# Patient Record
Sex: FEMALE | Race: WHITE | NOT HISPANIC OR LATINO | Employment: FULL TIME | URBAN - METROPOLITAN AREA
[De-identification: names, ages, dates, MRNs, and addresses within clinical notes are randomized per-mention and may not be internally consistent; named-entity substitution may affect disease eponyms.]

---

## 2017-03-03 RX ORDER — LORATADINE 10 MG/1
10 TABLET ORAL DAILY
Qty: 90 TABLET | Refills: 2 | Status: SHIPPED | OUTPATIENT
Start: 2017-03-03 | End: 2018-11-25

## 2017-03-03 RX ORDER — GABAPENTIN 300 MG/1
300 CAPSULE ORAL 2 TIMES DAILY
Qty: 180 CAPSULE | Refills: 2 | Status: SHIPPED | OUTPATIENT
Start: 2017-03-03 | End: 2017-08-22 | Stop reason: SDUPTHER

## 2017-03-09 ENCOUNTER — OFFICE VISIT (OUTPATIENT)
Dept: FAMILY MEDICINE CLINIC | Facility: CLINIC | Age: 62
End: 2017-03-09

## 2017-03-09 VITALS
BODY MASS INDEX: 22.36 KG/M2 | WEIGHT: 151 LBS | SYSTOLIC BLOOD PRESSURE: 108 MMHG | HEIGHT: 69 IN | HEART RATE: 72 BPM | RESPIRATION RATE: 16 BRPM | OXYGEN SATURATION: 97 % | DIASTOLIC BLOOD PRESSURE: 64 MMHG

## 2017-03-09 DIAGNOSIS — F51.01 PRIMARY INSOMNIA: Primary | ICD-10-CM

## 2017-03-09 PROBLEM — G43.109 MIGRAINE WITH AURA: Status: ACTIVE | Noted: 2017-03-09

## 2017-03-09 PROBLEM — M51.36 DDD (DEGENERATIVE DISC DISEASE), LUMBAR: Status: ACTIVE | Noted: 2017-03-09

## 2017-03-09 PROBLEM — G43.909 MIGRAINE: Status: ACTIVE | Noted: 2017-03-09

## 2017-03-09 PROBLEM — M79.609 EXTREMITY PAIN: Status: ACTIVE | Noted: 2017-03-09

## 2017-03-09 PROBLEM — M54.50 LBP (LOW BACK PAIN): Status: ACTIVE | Noted: 2017-03-09

## 2017-03-09 PROBLEM — M51.369 DDD (DEGENERATIVE DISC DISEASE), LUMBAR: Status: ACTIVE | Noted: 2017-03-09

## 2017-03-09 PROCEDURE — 99213 OFFICE O/P EST LOW 20 MIN: CPT | Performed by: FAMILY MEDICINE

## 2017-03-09 RX ORDER — ZOLPIDEM TARTRATE 10 MG/1
10 TABLET ORAL NIGHTLY PRN
Qty: 30 TABLET | Refills: 0 | OUTPATIENT
Start: 2017-03-09 | End: 2017-03-09 | Stop reason: SDUPTHER

## 2017-03-09 RX ORDER — ZOLPIDEM TARTRATE 10 MG/1
10 TABLET ORAL NIGHTLY PRN
Qty: 30 TABLET | Refills: 0 | Status: SHIPPED | OUTPATIENT
Start: 2017-03-09 | End: 2019-03-11 | Stop reason: SDUPTHER

## 2017-03-09 NOTE — PROGRESS NOTES
Subjective   Linda Cooper is a 61 y.o. female.     History of Present Illness   Linda is here for medication follow up.  She states she still takes Ambien occasionally and for that reason she is here to sign her contract.It helps her and she uses primarily for travel.    The following portions of the patient's history were reviewed and updated as appropriate: allergies, current medications, past medical history, past social history and problem list.    Review of Systems   All other systems reviewed and are negative.      Objective   Physical Exam   Constitutional: She is oriented to person, place, and time. She appears well-developed and well-nourished.   Cardiovascular: Normal rate and regular rhythm.    Pulmonary/Chest: Effort normal and breath sounds normal.   Neurological: She is alert and oriented to person, place, and time.   Psychiatric: She has a normal mood and affect. Her behavior is normal. Judgment and thought content normal.   Vitals reviewed.      Assessment/Plan   Linda was seen today for insomnia.    Diagnoses and all orders for this visit:    Primary insomnia  -     -     zolpidem (AMBIEN) 10 MG tablet; Take 1 tablet by mouth At Night As Needed (may use prn when traveling).      The patient has read and signed the [facility] Controlled Substance Contract.  I will continue to see patient for regular follow up appointments.  She are well controlled on current medication.  KHOI has been reviewed by me and is updated every 3 months. The patient is aware of the potential for addiction and dependence.

## 2017-05-04 ENCOUNTER — APPOINTMENT (OUTPATIENT)
Dept: WOMENS IMAGING | Facility: HOSPITAL | Age: 62
End: 2017-05-04

## 2017-05-04 PROCEDURE — 77067 SCR MAMMO BI INCL CAD: CPT | Performed by: RADIOLOGY

## 2017-08-22 RX ORDER — GABAPENTIN 300 MG/1
300 CAPSULE ORAL 2 TIMES DAILY
Qty: 60 CAPSULE | Refills: 0 | OUTPATIENT
Start: 2017-08-22 | End: 2017-11-17 | Stop reason: SDUPTHER

## 2017-08-22 NOTE — TELEPHONE ENCOUNTER
Patient left a VM requesting RF for Gabapentin.  KHOI has been placed in your folder for review.  Last OV: 03.2017 - Patient is due for an OV.  Ok to call-in 1MO. supply to the pharmacy until seen ?

## 2017-10-13 RX ORDER — GABAPENTIN 300 MG/1
300 CAPSULE ORAL 2 TIMES DAILY
Qty: 60 CAPSULE | Refills: 0
Start: 2017-10-13 | End: 2017-11-17 | Stop reason: SDUPTHER

## 2017-11-17 ENCOUNTER — OFFICE VISIT (OUTPATIENT)
Dept: FAMILY MEDICINE CLINIC | Facility: CLINIC | Age: 62
End: 2017-11-17

## 2017-11-17 VITALS
WEIGHT: 153 LBS | SYSTOLIC BLOOD PRESSURE: 106 MMHG | HEIGHT: 69 IN | BODY MASS INDEX: 22.66 KG/M2 | DIASTOLIC BLOOD PRESSURE: 72 MMHG | HEART RATE: 66 BPM | OXYGEN SATURATION: 99 %

## 2017-11-17 DIAGNOSIS — G89.29 CHRONIC LOW BACK PAIN, UNSPECIFIED BACK PAIN LATERALITY, WITH SCIATICA PRESENCE UNSPECIFIED: ICD-10-CM

## 2017-11-17 DIAGNOSIS — M79.661 PAIN IN BOTH LOWER LEGS: Primary | ICD-10-CM

## 2017-11-17 DIAGNOSIS — G43.109 MIGRAINE WITH AURA AND WITHOUT STATUS MIGRAINOSUS, NOT INTRACTABLE: ICD-10-CM

## 2017-11-17 DIAGNOSIS — M54.5 CHRONIC LOW BACK PAIN, UNSPECIFIED BACK PAIN LATERALITY, WITH SCIATICA PRESENCE UNSPECIFIED: ICD-10-CM

## 2017-11-17 DIAGNOSIS — Z23 NEED FOR VACCINATION: ICD-10-CM

## 2017-11-17 DIAGNOSIS — M79.662 PAIN IN BOTH LOWER LEGS: Primary | ICD-10-CM

## 2017-11-17 PROCEDURE — 90471 IMMUNIZATION ADMIN: CPT | Performed by: FAMILY MEDICINE

## 2017-11-17 PROCEDURE — 99214 OFFICE O/P EST MOD 30 MIN: CPT | Performed by: FAMILY MEDICINE

## 2017-11-17 PROCEDURE — 90686 IIV4 VACC NO PRSV 0.5 ML IM: CPT | Performed by: FAMILY MEDICINE

## 2017-11-17 RX ORDER — GABAPENTIN 300 MG/1
300 CAPSULE ORAL 2 TIMES DAILY
Qty: 60 CAPSULE | Refills: 5 | Status: SHIPPED | OUTPATIENT
Start: 2017-11-17 | End: 2018-11-05 | Stop reason: SDUPTHER

## 2017-11-17 RX ORDER — RIZATRIPTAN BENZOATE 10 MG/1
10 TABLET ORAL ONCE AS NEEDED
Qty: 9 TABLET | Refills: 5 | Status: SHIPPED | OUTPATIENT
Start: 2017-11-17 | End: 2018-12-15 | Stop reason: SDUPTHER

## 2017-11-17 NOTE — PROGRESS NOTES
Subjective   Linda Cooper is a 62 y.o. female.     History of Present Illness   Linda is here today for a medication visit for a refill of her Gabapentin. She is taking this to help with nerve discomfort in her legs.  She has seasonal allergies and she has noticed that she gets jittery on Claritin.  She has a hx of migraine  She has been on an estrderm patch for about 10 years  And has had trouble because she has been cutting about a 30 % of the patch off at a time.    The following portions of the patient's history were reviewed and updated as appropriate: allergies, current medications, past medical history, past social history and problem list.    Review of Systems   All other systems reviewed and are negative.      Objective   Physical Exam   Constitutional: She is oriented to person, place, and time. She appears well-developed and well-nourished. No distress.   HENT:   Head: Normocephalic and atraumatic.   Eyes: EOM are normal. Pupils are equal, round, and reactive to light.   Cardiovascular: Normal rate and regular rhythm.    Pulmonary/Chest: Effort normal and breath sounds normal.   Musculoskeletal:        Lumbar back: She exhibits decreased range of motion, tenderness and spasm. She exhibits no swelling, no edema and no deformity.   Pain increases when rising from a chair, getting into car. Pain with straight leg raising on affected side.   Neurological: She is alert and oriented to person, place, and time.   Skin: Skin is warm and dry. No rash noted.   Nursing note and vitals reviewed.      Assessment/Plan   Linda was seen today for back pain and med refill.    Diagnoses and all orders for this visit:    Pain in both lower legs  -     gabapentin (NEURONTIN) 300 MG capsule; Take 1 capsule by mouth 2 (Two) Times a Day.    Chronic low back pain, unspecified back pain laterality, with sciatica presence unspecified    Migraine with aura and without status migrainosus, not intractable  -     rizatriptan  (MAXALT) 10 MG tablet; Take 1 tablet by mouth 1 (One) Time As Needed for Migraine for up to 9 doses. TAKE 1 TABLET BY MOUTH AT ONSET OF SYMPTOMS.

## 2018-05-07 ENCOUNTER — APPOINTMENT (OUTPATIENT)
Dept: WOMENS IMAGING | Facility: HOSPITAL | Age: 63
End: 2018-05-07

## 2018-05-07 PROCEDURE — 77063 BREAST TOMOSYNTHESIS BI: CPT | Performed by: RADIOLOGY

## 2018-05-07 PROCEDURE — 77067 SCR MAMMO BI INCL CAD: CPT | Performed by: RADIOLOGY

## 2018-05-24 DIAGNOSIS — F51.01 PRIMARY INSOMNIA: ICD-10-CM

## 2018-05-25 RX ORDER — ZOLPIDEM TARTRATE 10 MG/1
TABLET ORAL
Qty: 30 TABLET | OUTPATIENT
Start: 2018-05-25

## 2018-07-26 ENCOUNTER — TELEPHONE (OUTPATIENT)
Dept: FAMILY MEDICINE CLINIC | Facility: CLINIC | Age: 63
End: 2018-07-26

## 2018-07-26 NOTE — TELEPHONE ENCOUNTER
Patient states that for the last 3 weeks she has had sciatica. She does have hx of back surgeries and problems. She is currently in [place] and has an appointment schedule with Sports Medicine on Tuesday and and appointment with a neurosurgeon in [place] at the end of September. Patient was advised to go to an Urgent care near her to be evaluated and get xrays if needed. She was advised to obtain an xray disk if images are taken and bring them to follow up appointments. She will contact her insurance to see if they require a referral to sports medicine. Patient voiced understanding.

## 2018-07-30 ENCOUNTER — TELEPHONE (OUTPATIENT)
Dept: FAMILY MEDICINE CLINIC | Facility: CLINIC | Age: 63
End: 2018-07-30

## 2018-07-30 NOTE — TELEPHONE ENCOUNTER
Pt in [place] and wanted you to know she is having terrible back and leg pain.  She is seeking treatment there but wanted you to be aware.  She will send records.

## 2018-08-14 ENCOUNTER — OFFICE VISIT (OUTPATIENT)
Dept: FAMILY MEDICINE CLINIC | Facility: CLINIC | Age: 63
End: 2018-08-14

## 2018-08-14 VITALS
RESPIRATION RATE: 16 BRPM | WEIGHT: 156 LBS | OXYGEN SATURATION: 99 % | BODY MASS INDEX: 23.11 KG/M2 | DIASTOLIC BLOOD PRESSURE: 66 MMHG | HEIGHT: 69 IN | SYSTOLIC BLOOD PRESSURE: 102 MMHG | HEART RATE: 57 BPM

## 2018-08-14 DIAGNOSIS — G89.29 CHRONIC BILATERAL LOW BACK PAIN WITH RIGHT-SIDED SCIATICA: Primary | ICD-10-CM

## 2018-08-14 DIAGNOSIS — M54.41 CHRONIC BILATERAL LOW BACK PAIN WITH RIGHT-SIDED SCIATICA: Primary | ICD-10-CM

## 2018-08-14 PROCEDURE — 99213 OFFICE O/P EST LOW 20 MIN: CPT | Performed by: FAMILY MEDICINE

## 2018-08-14 NOTE — PROGRESS NOTES
Subjective   Linda Cooper is a 63 y.o. female.     History of Present Illness   Linda is here w/ back pain.  This is a chronic problem for her.  She has had back surgery x 2 w/ Dr Camarillo.  She is seeing the PA on 8/29/18 but feels as if she needs to be seen sooner.  She has been on steroids and is now on Aleve and she is taking gabapentin at night.     The following portions of the patient's history were reviewed and updated as appropriate: allergies, current medications, past medical history, past social history, past surgical history and problem list.    Review of Systems   Musculoskeletal: Positive for back pain.        Rt leg pain   All other systems reviewed and are negative.      Objective   Physical Exam   Constitutional: She is oriented to person, place, and time. She appears well-developed.   Musculoskeletal:   Pain with ambulation and she is listing to the left.   Neurological: She is alert and oriented to person, place, and time.   Nursing note and vitals reviewed.      Assessment/Plan   Linda was seen today for back pain.    Diagnoses and all orders for this visit:    Chronic bilateral low back pain with right-sided sciatica    I have encouraged her to get on the cancellation list and advised her to swim, rest until she can see her neurosurgeon.

## 2018-08-29 ENCOUNTER — OFFICE VISIT (OUTPATIENT)
Dept: NEUROSURGERY | Facility: CLINIC | Age: 63
End: 2018-08-29

## 2018-08-29 VITALS
HEART RATE: 68 BPM | HEIGHT: 69 IN | WEIGHT: 154.8 LBS | DIASTOLIC BLOOD PRESSURE: 68 MMHG | SYSTOLIC BLOOD PRESSURE: 111 MMHG | BODY MASS INDEX: 22.93 KG/M2

## 2018-08-29 DIAGNOSIS — M51.36 DDD (DEGENERATIVE DISC DISEASE), LUMBAR: Primary | ICD-10-CM

## 2018-08-29 DIAGNOSIS — M48.062 SPINAL STENOSIS, LUMBAR REGION, WITH NEUROGENIC CLAUDICATION: ICD-10-CM

## 2018-08-29 PROCEDURE — 99244 OFF/OP CNSLTJ NEW/EST MOD 40: CPT | Performed by: PHYSICIAN ASSISTANT

## 2018-08-29 RX ORDER — ESTRADIOL 0.1 MG/D
1 PATCH TRANSDERMAL WEEKLY
COMMUNITY
Start: 2018-08-22 | End: 2022-05-03 | Stop reason: SDUPTHER

## 2018-08-29 NOTE — PROGRESS NOTES
Subjective   Patient ID: Linda Cooper is a 63 y.o. female is being seen for consultation today at the request of Billy Poole MD for lower back pain that radiates into the right leg. Patient complains of numbness and tingling in her right leg. Patient has had 2 previous back SX. Patient had a open right L4-L5 microdiscectomy with Dr. Camarillo in 2012.    Back Pain   This is a chronic problem. The current episode started more than 1 year ago. The problem occurs constantly. The problem has been gradually worsening since onset. The pain is present in the lumbar spine. The quality of the pain is described as aching (aching in right calf, spasms). The pain radiates to the right foot, right knee and right thigh. The pain is at a severity of 6/10. The pain is moderate. The symptoms are aggravated by lying down, position, bending and twisting. Stiffness is present all day. Associated symptoms include leg pain, numbness, tingling and weakness. Pertinent negatives include no bladder incontinence, bowel incontinence or chest pain. She has tried chiropractic manipulation for the symptoms.       The following portions of the patient's history were reviewed and updated as appropriate: allergies, current medications, past family history, past medical history, past social history, past surgical history and problem list.    Review of Systems   Respiratory: Negative for chest tightness and shortness of breath.    Cardiovascular: Negative for chest pain.   Gastrointestinal: Negative for bowel incontinence.   Genitourinary: Negative for bladder incontinence.   Musculoskeletal: Positive for arthralgias and back pain.   Neurological: Positive for tingling, weakness and numbness.   All other systems reviewed and are negative.      Objective   Physical Exam   Constitutional: She is oriented to person, place, and time. She appears well-developed and well-nourished.   HENT:   Head: Normocephalic and atraumatic.   Right Ear:  External ear normal.   Left Ear: External ear normal.   Eyes: Pupils are equal, round, and reactive to light. Conjunctivae and EOM are normal. Right eye exhibits no discharge. Left eye exhibits no discharge.   Neck: Normal range of motion. Neck supple. No tracheal deviation present.   Pulmonary/Chest: Effort normal. No stridor. No respiratory distress.   Musculoskeletal: Normal range of motion. She exhibits no edema, tenderness or deformity.   Neurological: She is alert and oriented to person, place, and time. She has normal strength and normal reflexes. She displays no atrophy, no tremor and normal reflexes. No cranial nerve deficit or sensory deficit. She exhibits normal muscle tone. She displays a negative Romberg sign. She displays no seizure activity. Coordination and gait normal.   No long tract signs   Skin: Skin is warm and dry.   Psychiatric: She has a normal mood and affect. Her behavior is normal. Judgment and thought content normal.   Nursing note and vitals reviewed.    Neurologic Exam     Mental Status   Oriented to person, place, and time.     Cranial Nerves     CN III, IV, VI   Pupils are equal, round, and reactive to light.  Extraocular motions are normal.     Motor Exam     Strength   Strength 5/5 throughout.       Assessment/Plan   Independent Review of Radiographic Studies:    Did review the MRI of the lumbar spine from [facility] in [place] from July 27, 2018.  It shows multilevel degenerative disc disease that is most severe at L4-L5 L5-S1 with degenerative scoliosis with the apex at L2-L3.  There is a mild grade 1 anterolisthesis at L2-L3.  There is multilevel disc bulging and facet arthropathy with ligamentum flavum thickening with moderate spinal stenosis at L2-L3 and L3-L4.  There is multilevel foraminal narrowing particularly on the right at L2-L3.    Medical Decision Making:    Ms Cooper is referred back to us today by Dr. Poole for increased back and leg pain particularly  over the last 6 weeks.  She had a previous microdiscectomy at L5-S1 over 25 years ago.  She also had an open right L4-L5 microdiscectomy by Dr. Camarillo in 2012.  She did well over the years until just the last 2 months when she began having fairly significant back pain that radiates into the right buttock/groin and anterolateral thigh.  The pain is described as a deep ache/throbbing discomfort that is moderate in severity.  It worsens with any prolonged activity and improved some with rest.  She normally does yoga on a regular basis and also swims and walks daily.  She has had to really limit her activity over the last few weeks because of the pain.  She has been placed on gabapentin 300 mg twice a day and is also using naproxen at night with mild relief.  She denies any numbness or tingling or incontinence or focal weakness.    Her exam does not reveal any focal deficits.  She has a negative Homar sign.    I did review the MRI with her and explained that her symptoms do fit with the moderate stenosis at L2-L3 and L3-L4.  She does have some low back pain as well which is certainly more attributable to the degenerative change.  She would very much like to avoid additional surgical intervention if possible.  We discussed both physical therapy as well as epidural injections.  She is aware of the risks of bleeding, infection and headache with the epidurals and does wish to proceed.  She will call with any worsening and otherwise follow-up in 6 weeks.  If the pain fails to improve we could consider physical therapy or even a myelogram which we did discuss today.  Linda was seen today for back pain.    Diagnoses and all orders for this visit:    DDD (degenerative disc disease), lumbar  -     Epidural Block    Spinal stenosis, lumbar region, with neurogenic claudication  -     Epidural Block      Return in about 6 weeks (around 10/10/2018).

## 2018-09-13 ENCOUNTER — ANESTHESIA (OUTPATIENT)
Dept: PAIN MEDICINE | Facility: HOSPITAL | Age: 63
End: 2018-09-13

## 2018-09-13 ENCOUNTER — HOSPITAL ENCOUNTER (OUTPATIENT)
Dept: GENERAL RADIOLOGY | Facility: HOSPITAL | Age: 63
Discharge: HOME | End: 2018-09-13

## 2018-09-13 ENCOUNTER — HOSPITAL ENCOUNTER (OUTPATIENT)
Dept: PAIN MEDICINE | Facility: HOSPITAL | Age: 63
Discharge: HOME | End: 2018-09-13
Admitting: ANESTHESIOLOGY

## 2018-09-13 ENCOUNTER — ANESTHESIA EVENT (OUTPATIENT)
Dept: PAIN MEDICINE | Facility: HOSPITAL | Age: 63
End: 2018-09-13

## 2018-09-13 VITALS
BODY MASS INDEX: 21.92 KG/M2 | HEART RATE: 73 BPM | RESPIRATION RATE: 16 BRPM | WEIGHT: 148 LBS | HEIGHT: 69 IN | SYSTOLIC BLOOD PRESSURE: 98 MMHG | TEMPERATURE: 97.5 F | OXYGEN SATURATION: 99 % | DIASTOLIC BLOOD PRESSURE: 54 MMHG

## 2018-09-13 DIAGNOSIS — R52 PAIN: ICD-10-CM

## 2018-09-13 DIAGNOSIS — M51.36 DDD (DEGENERATIVE DISC DISEASE), LUMBAR: Primary | ICD-10-CM

## 2018-09-13 DIAGNOSIS — M48.062 SPINAL STENOSIS, LUMBAR REGION, WITH NEUROGENIC CLAUDICATION: ICD-10-CM

## 2018-09-13 PROCEDURE — 25010000002 METHYLPREDNISOLONE PER 80 MG: Performed by: ANESTHESIOLOGY

## 2018-09-13 PROCEDURE — 25010000002 MIDAZOLAM PER 1 MG: Performed by: ANESTHESIOLOGY

## 2018-09-13 PROCEDURE — C1755 CATHETER, INTRASPINAL: HCPCS

## 2018-09-13 PROCEDURE — 77003 FLUOROGUIDE FOR SPINE INJECT: CPT

## 2018-09-13 RX ORDER — FENTANYL CITRATE 50 UG/ML
50 INJECTION, SOLUTION INTRAMUSCULAR; INTRAVENOUS AS NEEDED
Status: DISCONTINUED | OUTPATIENT
Start: 2018-09-13 | End: 2018-09-14 | Stop reason: HOSPADM

## 2018-09-13 RX ORDER — MIDAZOLAM HYDROCHLORIDE 1 MG/ML
1 INJECTION INTRAMUSCULAR; INTRAVENOUS
Status: DISCONTINUED | OUTPATIENT
Start: 2018-09-13 | End: 2018-09-14 | Stop reason: HOSPADM

## 2018-09-13 RX ORDER — LIDOCAINE HYDROCHLORIDE 10 MG/ML
1 INJECTION, SOLUTION INFILTRATION; PERINEURAL ONCE
Status: DISCONTINUED | OUTPATIENT
Start: 2018-09-13 | End: 2018-09-14 | Stop reason: HOSPADM

## 2018-09-13 RX ORDER — METHYLPREDNISOLONE ACETATE 80 MG/ML
80 INJECTION, SUSPENSION INTRA-ARTICULAR; INTRALESIONAL; INTRAMUSCULAR; SOFT TISSUE ONCE
Status: COMPLETED | OUTPATIENT
Start: 2018-09-13 | End: 2018-09-13

## 2018-09-13 RX ADMIN — MIDAZOLAM HYDROCHLORIDE 2 MG: 2 INJECTION, SOLUTION INTRAMUSCULAR; INTRAVENOUS at 09:50

## 2018-09-13 RX ADMIN — METHYLPREDNISOLONE ACETATE 80 MG: 80 INJECTION, SUSPENSION INTRA-ARTICULAR; INTRALESIONAL; INTRAMUSCULAR; SOFT TISSUE at 09:57

## 2018-09-13 NOTE — ANESTHESIA PROCEDURE NOTES
PAIN Epidural block    Patient location during procedure: pain clinic  Start Time: 9/13/2018 9:47 AM  Stop Time: 9/13/2018 10:00 AM  Indication:at surgeon's request and procedure for pain  Performed By  Anesthesiologist: SHAWNA FLOREZ  Preanesthetic Checklist  Completed: patient identified, site marked, surgical consent, pre-op evaluation, timeout performed, IV checked, risks and benefits discussed and monitors and equipment checked  Additional Notes  Dx:  Post-Op Diagnosis Codes:     * Lumbar degenerative disc disease (M51.36)     * Lumbar spinal stenosis (M48.061)  80 mg depomedrol in epid    Plan : return to clinic as needed  Prep:  Pt Position:prone (prone)  Sterile Tech:cap, gloves, mask and sterile barrier  Prep:chlorhexidine gluconate and isopropyl alcohol  Monitoring:blood pressure monitoring, EKG and continuous pulse oximetry  Procedure:  Sedation: yes   Approach:right paramedian  Guidance: fluoroscopy and c arm pa and lat and loss of resistance  Location:lumbar  Level:3-4 (interlaminar)  Needle Type:[employer]  Needle Gauge:20  Aspiration:negative  Medications:  Depomedrol:80 mg  Preservative Free Saline:3mL    Post Assessment:  Post-procedure: bandaid.  Pt Tolerance:patient tolerated the procedure well with no apparent complications  Complications:no

## 2018-09-13 NOTE — H&P
[facility]    History and Physical    Patient Name: Linda Cooper  :  1955  MRN:  [id number]  Date of Admission: 2018    Subjective     Patient is a 63 y.o. female presents with chief complaint of chronic, constant, moderate, 1-4/10, ? etiology, aching, spasms, numbness leg: groin, right pain.  Onset of symptoms was gradual starting several months ago.  Symptoms are associated/aggravated by activity. Symptoms improve with pain medication and rest    The following portions of the patients history were reviewed and updated as appropriate: current medications, allergies, past medical history, past surgical history, past family history, past social history and problem list                Objective     Past Medical History:   Past Medical History:   Diagnosis Date   • Allergic    • Endometriosis     Followed by Dr. Cervantes. S/p hysterectomy .   • March fracture     Marche fracture. healing well. Followed by Dr. Flores.   • Menopausal symptoms     Will refil estradiol.   • Migraine     Will start Frova and she will also try Excedrin migraine.   • Mitral regurgitation     Trivial mitral regurg. No antibiotics needed.   • Sciatica     Sciatica and s/p disc surgery at L5-S1. Stable now with occasional bouts of sciatica.     Past Surgical History:   Past Surgical History:   Procedure Laterality Date   • HYSTERECTOMY  2007    Endometriosis. Followed by Dr. Cervantes. S/p hysterectomy .   • LUMBAR DISC SURGERY      Sciatica and s/p disc surgery at L5-S1. Stable now with occasional bouts of sciatica.     Family History:   Family History   Problem Relation Age of Onset   • Hypertension Mother    • Coronary artery disease Mother    • Coronary artery disease Father    • Other Brother         Thyroid     Social History:   Social History   Substance Use Topics   • Smoking status: Current Some Day Smoker     Packs/day: 0.25     Types: Cigarettes   • Smokeless tobacco: Never Used   • Alcohol use Yes       Comment: 4-5 drinks/week       Vital Signs Range for the last 24 hours  Temperature:     Temp Source:     BP:     Pulse:     Respirations:     SPO2:     O2 Amount (l/min):     O2 Devices     Weight:           --------------------------------------------------------------------------------    Current Outpatient Prescriptions   Medication Sig Dispense Refill   • CLIMARA 0.1 MG/24HR patch      • Estradiol (ESTRADERM TD) Place 1 film on the skin 1 (one) time per week. Estraderm 0.1 mg/24 hours twice weekly transdermal film, extended release     • gabapentin (NEURONTIN) 300 MG capsule Take 1 capsule by mouth 2 (Two) Times a Day. 60 capsule 5   • loratadine (CLARITIN) 10 MG tablet Take 1 tablet by mouth Daily. TAKE 1 TABLET BY MOUTH EVERY DAY 90 tablet 2   • magnesium oxide (MAGOX) 400 (241.3 MG) MG tablet tablet Take 1 tablet by mouth Daily. TAKE 1 TABLET BY MOUTH EVERY DAY 90 each 2   • rizatriptan (MAXALT) 10 MG tablet Take 1 tablet by mouth 1 (One) Time As Needed for Migraine for up to 9 doses. TAKE 1 TABLET BY MOUTH AT ONSET OF SYMPTOMS. 9 tablet 5   • zolpidem (AMBIEN) 10 MG tablet Take 1 tablet by mouth At Night As Needed (may use prn when traveling). 30 tablet 0     Current Facility-Administered Medications   Medication Dose Route Frequency Provider Last Rate Last Dose   • fentaNYL citrate (PF) (SUBLIMAZE) injection 50 mcg  50 mcg Intravenous PRN Jose Narayanan MD       • iopamidol (ISOVUE-M 200) injection 41%  3 mL Epidural Once in imaging Jose Narayanan MD       • lidocaine (XYLOCAINE) 1 % injection 1 mL  1 mL Intradermal Once Jose Narayanan MD       • methylPREDNISolone acetate (DEPO-medrol) injection 80 mg  80 mg Intramuscular Once Jose Narayanan MD       • midazolam (VERSED) injection 1 mg  1 mg Intravenous Q5 Min PRN Jose Narayanan MD           --------------------------------------------------------------------------------  Assessment/Plan      Anesthesia Evaluation     Patient summary reviewed  and Nursing notes reviewed         Pain impairs ability to perform ADLs: Housekeeping, Ambulation, Exercise/Activity and Working  Modalities previously tried to control pain with limited effectiveness within the last 4-6 weeks: Rest, OTC medications and Other     Airway   Mallampati: II  Dental - normal exam     Pulmonary - negative pulmonary ROS and normal exam   Cardiovascular - normal exam    (+) valvular problems/murmurs,       Neuro/Psych- negative ROS and neuro exam normal  GI/Hepatic/Renal/Endo - negative ROS     Musculoskeletal (-) negative ROS and normal exam    Abdominal  - normal exam   Substance History - negative use     OB/GYN negative ob/gyn ROS         Other            Phys Exam Other: acupuncture         Diagnosis and Plan    Treatment Plan  ASA 2      Procedures: Lumbar Epidural Steroid Injection(LESI), With fluoroscopy,       Anesthetic plan and risks discussed with patient.          Diagnosis     * Lumbar degenerative disc disease [M51.36]     * Lumbar spinal stenosis [M48.061]              CHIEF COMPLAINT:       HISTORY OF PRESENT ILLNESS:      PAST MEDICAL HISTORY:  Current Outpatient Prescriptions on File Prior to Encounter   Medication Sig Dispense Refill   • CLIMARA 0.1 MG/24HR patch      • Estradiol (ESTRADERM TD) Place 1 film on the skin 1 (one) time per week. Estraderm 0.1 mg/24 hours twice weekly transdermal film, extended release     • gabapentin (NEURONTIN) 300 MG capsule Take 1 capsule by mouth 2 (Two) Times a Day. 60 capsule 5   • loratadine (CLARITIN) 10 MG tablet Take 1 tablet by mouth Daily. TAKE 1 TABLET BY MOUTH EVERY DAY 90 tablet 2   • magnesium oxide (MAGOX) 400 (241.3 MG) MG tablet tablet Take 1 tablet by mouth Daily. TAKE 1 TABLET BY MOUTH EVERY DAY 90 each 2   • rizatriptan (MAXALT) 10 MG tablet Take 1 tablet by mouth 1 (One) Time As Needed for Migraine for up to 9 doses. TAKE 1 TABLET BY MOUTH AT ONSET OF SYMPTOMS. 9 tablet 5   • zolpidem (AMBIEN) 10 MG tablet Take 1  tablet by mouth At Night As Needed (may use prn when traveling). 30 tablet 0     No current facility-administered medications on file prior to encounter.        Past Medical History:   Diagnosis Date   • Allergic    • Endometriosis     Followed by Dr. Cervantes. S/p hysterectomy 12/07.   • March fracture     Marche fracture. healing well. Followed by Dr. Flores.   • Menopausal symptoms     Will refil estradiol.   • Migraine     Will start Frova and she will also try Excedrin migraine.   • Mitral regurgitation     Trivial mitral regurg. No antibiotics needed.   • Sciatica     Sciatica and s/p disc surgery at L5-S1. Stable now with occasional bouts of sciatica.         SOCIAL HISTORY:  No tobacco    REVIEW OF SYSTEMS:  No hematologic infectious or constitutional symptoms  Other review of systems non-contributory    PHYSICAL EXAM:  There were no vitals taken for this visit.  Well-developed well-nourished no acute distress  Extra ocular movements intact  Mallampati class 2 airway  Cardiac:  Regular rate and rhythm  Lungs:  Clear to auscultation bilaterally with good effort  Alert and oriented ×3  Deep tendon reflexes normal in the bilateral patella  Negative straight leg raise bilaterally  5 out of 5 strength bilateral upper and lower extremities  Lumbar spine without obvious deformities ecchymoses  Lumbar spine nontender to palpation      DIAGNOSIS:  Post-Op Diagnosis Codes:     * Lumbar degenerative disc disease [M51.36]     * Lumbar spinal stenosis [M48.061]    PLAN:  1.  Lumbar epidural steroid injections, up to 3, spaced 1-2 weeks apart.  If pain control is acceptable after 1 or 2 injections, it was discussed with the patient that they may return for the subsequent injections if and when their pain returns.  The risks were discussed with the patient including failure of relief, worsening pain, Headache (post dural puncture headache), bleeding (epidural hematoma) and infection (epidural abscess or skin infection).  2.   Physical therapy exercises at home as prescribed by physical therapy or from the pain clinic handout (given to the patient).  Continuation of these exercises every day, or multiple times per week, even when the patient has good pain relief, was stressed to the patient as a preventative measure to decrease the frequency and severity of future pain episodes.  3.  Continue pain medicines as already prescribed.  If patient not currently taking any, it is recommended to begin Acetaminophen 1000 mg po q 8 hours.  If other medicines containing Acetaminophen are currently prescribed, maintain daily dose at 3000 mg.    4.  If they can tolerate NSAIDS, it is recommended to take Ibuprofen 600 mg po q 6 hours for 7 days during pain exacerbations.  Alternatively, they may substitute an NSAID of their choice (e.g. Aleve).  This may be taken at the same time as Acetaminophen.  5.  Heat and ice to the affected area as tolerated for pain control.  It was discussed that heating pads can cause burns.  6.  Daily low impact exercise such as walking or water exercise was recommended to maintain overall health and aid in weight control.   7.  Follow up as needed for subsequent injections.  8.  Patient was counseled to abstain from tobacco products.

## 2018-09-17 ENCOUNTER — TELEPHONE (OUTPATIENT)
Dept: NEUROSURGERY | Facility: CLINIC | Age: 63
End: 2018-09-17

## 2018-09-17 NOTE — TELEPHONE ENCOUNTER
She had one ADY last Thursday and only has noticed 30% relief. She was told by pain management that it maybe a week before she notices any improvement. She was wanted to know your opinion on CBD oil or magnesium supplement ? She also had questions on how soon she should have her next ADY. She is scheduled in two weeks ro 2nd ADY. I let her know that they do build on each other.

## 2018-09-18 NOTE — TELEPHONE ENCOUNTER
Yes, I would get the next one in 2wks as scheduled if her pain has not improved. I don't think mg would help but it would not hurt. To be honest there just aren't enough studies on CBD oil that I can make any recommendation for that either.  I would try the next ADY and see how she feels.

## 2018-09-19 NOTE — TELEPHONE ENCOUNTER
Patient returned Carey's call. I gave her the information from [place]. She verbalized understanding

## 2018-09-27 ENCOUNTER — APPOINTMENT (OUTPATIENT)
Dept: PAIN MEDICINE | Facility: HOSPITAL | Age: 63
End: 2018-09-27

## 2018-10-02 NOTE — PROGRESS NOTES
Subjective   Patient ID: Linda Cooper is a 63 y.o. female is here today for follow-up for back and right leg pain after ADY on 10/04/18. Patient states she noticed relief up until today. She is having increased right sided back pain that radiates into her right leg. Patient complains of numbness in her right foot.     Back Pain   This is a recurrent problem. The problem occurs intermittently. The problem has been gradually worsening since onset. The pain is present in the lumbar spine. The quality of the pain is described as aching. The pain radiates to the right foot, right knee and right thigh. The pain is at a severity of 5/10. The pain is moderate. The symptoms are aggravated by position, sitting and standing. Associated symptoms include leg pain, numbness, pelvic pain, tingling and weakness. Pertinent negatives include no bladder incontinence, bowel incontinence or chest pain. Treatments tried: L-ADY. The treatment provided mild relief.       The following portions of the patient's history were reviewed and updated as appropriate: allergies, current medications, past family history, past medical history, past social history, past surgical history and problem list.    Review of Systems   Respiratory: Negative for chest tightness and shortness of breath.    Cardiovascular: Negative for chest pain.   Gastrointestinal: Negative for bowel incontinence.   Genitourinary: Positive for pelvic pain. Negative for bladder incontinence and difficulty urinating.   Musculoskeletal: Positive for back pain.   Neurological: Positive for tingling, weakness and numbness.   All other systems reviewed and are negative.      Objective   Physical Exam   Constitutional: She is oriented to person, place, and time. She appears well-developed and well-nourished.   HENT:   Head: Normocephalic and atraumatic.   Right Ear: External ear normal.   Left Ear: External ear normal.   Eyes: Pupils are equal, round, and reactive to light.  Conjunctivae and EOM are normal. Right eye exhibits no discharge. Left eye exhibits no discharge.   Neck: Normal range of motion. Neck supple. No tracheal deviation present.   Pulmonary/Chest: Effort normal. No stridor. No respiratory distress.   Musculoskeletal: Normal range of motion. She exhibits no edema, tenderness or deformity.   Neurological: She is alert and oriented to person, place, and time. She has normal strength and normal reflexes. She displays no atrophy, no tremor and normal reflexes. No cranial nerve deficit or sensory deficit. She exhibits normal muscle tone. She displays a negative Romberg sign. She displays no seizure activity. Coordination and gait normal.   No long tract signs   Skin: Skin is warm and dry.   Psychiatric: She has a normal mood and affect. Her behavior is normal. Judgment and thought content normal.   Nursing note and vitals reviewed.    Neurologic Exam     Mental Status   Oriented to person, place, and time.     Cranial Nerves     CN III, IV, VI   Pupils are equal, round, and reactive to light.  Extraocular motions are normal.     Motor Exam     Strength   Strength 5/5 throughout.       Assessment/Plan   Independent Review of Radiographic Studies:      Medical Decision Making:    Ms. Cooper had a previous microdiscectomy at L5-S1 over 25 years ago.  She also had an open right L4-L5 microdiscectomy by Dr. Camarillo in 2012.  She did well over the years until just the last 4 months when she began having fairly significant back pain that radiates into the right buttock/groin and anterolateral thigh.  The pain is described as a deep ache/throbbing discomfort that is moderate in severity.  It worsens with any prolonged activity and improved some with rest. Her MRI showed moderate stenosis L2-3, L3-4.     He has now had 2 epidural injections.  Her pain was about 30% better after the first injection and she had her second injection last week.  Very shortly thereafter her pain was  nearly resolved but unfortunately just in the last 24 hours her pain has returned and is now quite severe.  It is localized to the low back and right anterolateral thigh.  It is described as burning and moderate to severe.  It worsens with any walking.    Given that she is only had recurrent pain for 1 day I suggested giving it a bit more time and backing off on her activities for the rest of the week.  She can call next week with an update.  If the pain has improved then she can plan on getting the third epidural injection and calling thereafter.  If not then we could consider a L spine myelogram and follow up thereafter with Dr. Camarillo.  We discussed a myelogram as well as the risks of bleeding, infection and headache.  She will call Monday with an update.   Linda was seen today for follow-up and back pain.    Diagnoses and all orders for this visit:    Spinal stenosis, lumbar region, with neurogenic claudication    DDD (degenerative disc disease), lumbar      Return if symptoms worsen or fail to improve.

## 2018-10-04 ENCOUNTER — HOSPITAL ENCOUNTER (OUTPATIENT)
Dept: PAIN MEDICINE | Facility: HOSPITAL | Age: 63
Discharge: HOME | End: 2018-10-04
Attending: ANESTHESIOLOGY | Admitting: ANESTHESIOLOGY

## 2018-10-04 ENCOUNTER — HOSPITAL ENCOUNTER (OUTPATIENT)
Dept: GENERAL RADIOLOGY | Facility: HOSPITAL | Age: 63
Discharge: HOME | End: 2018-10-04

## 2018-10-04 ENCOUNTER — ANESTHESIA EVENT (OUTPATIENT)
Dept: PAIN MEDICINE | Facility: HOSPITAL | Age: 63
End: 2018-10-04

## 2018-10-04 ENCOUNTER — ANESTHESIA (OUTPATIENT)
Dept: PAIN MEDICINE | Facility: HOSPITAL | Age: 63
End: 2018-10-04

## 2018-10-04 VITALS
DIASTOLIC BLOOD PRESSURE: 78 MMHG | HEART RATE: 73 BPM | TEMPERATURE: 97.4 F | BODY MASS INDEX: 21.92 KG/M2 | SYSTOLIC BLOOD PRESSURE: 101 MMHG | RESPIRATION RATE: 16 BRPM | OXYGEN SATURATION: 98 % | HEIGHT: 69 IN | WEIGHT: 148 LBS

## 2018-10-04 DIAGNOSIS — M48.062 SPINAL STENOSIS, LUMBAR REGION, WITH NEUROGENIC CLAUDICATION: ICD-10-CM

## 2018-10-04 DIAGNOSIS — M51.36 DDD (DEGENERATIVE DISC DISEASE), LUMBAR: ICD-10-CM

## 2018-10-04 DIAGNOSIS — R52 PAIN: ICD-10-CM

## 2018-10-04 PROCEDURE — 25010000002 METHYLPREDNISOLONE PER 80 MG: Performed by: ANESTHESIOLOGY

## 2018-10-04 PROCEDURE — 77003 FLUOROGUIDE FOR SPINE INJECT: CPT

## 2018-10-04 PROCEDURE — C1755 CATHETER, INTRASPINAL: HCPCS

## 2018-10-04 RX ORDER — MIDAZOLAM HYDROCHLORIDE 1 MG/ML
1 INJECTION INTRAMUSCULAR; INTRAVENOUS AS NEEDED
Status: DISCONTINUED | OUTPATIENT
Start: 2018-10-04 | End: 2018-10-05 | Stop reason: HOSPADM

## 2018-10-04 RX ORDER — SODIUM CHLORIDE 0.9 % (FLUSH) 0.9 %
1-10 SYRINGE (ML) INJECTION AS NEEDED
Status: DISCONTINUED | OUTPATIENT
Start: 2018-10-04 | End: 2018-10-05 | Stop reason: HOSPADM

## 2018-10-04 RX ORDER — FENTANYL CITRATE 50 UG/ML
50 INJECTION, SOLUTION INTRAMUSCULAR; INTRAVENOUS AS NEEDED
Status: DISCONTINUED | OUTPATIENT
Start: 2018-10-04 | End: 2018-10-05 | Stop reason: HOSPADM

## 2018-10-04 RX ORDER — LIDOCAINE HYDROCHLORIDE 10 MG/ML
1 INJECTION, SOLUTION INFILTRATION; PERINEURAL ONCE AS NEEDED
Status: DISCONTINUED | OUTPATIENT
Start: 2018-10-04 | End: 2018-10-05 | Stop reason: HOSPADM

## 2018-10-04 RX ORDER — METHYLPREDNISOLONE ACETATE 80 MG/ML
80 INJECTION, SUSPENSION INTRA-ARTICULAR; INTRALESIONAL; INTRAMUSCULAR; SOFT TISSUE ONCE
Status: COMPLETED | OUTPATIENT
Start: 2018-10-04 | End: 2018-10-04

## 2018-10-04 RX ADMIN — METHYLPREDNISOLONE ACETATE 80 MG: 80 INJECTION, SUSPENSION INTRA-ARTICULAR; INTRALESIONAL; INTRAMUSCULAR; SOFT TISSUE at 08:57

## 2018-10-04 NOTE — ANESTHESIA PROCEDURE NOTES
PAIN Epidural block      Patient reassessed immediately prior to procedure    Patient location during procedure: pain clinic  Indication:procedure for pain  Performed By  Anesthesiologist: ANGEL PERALES  Preanesthetic Checklist  Completed: patient identified and risks and benefits discussed  Additional Notes  Diagnosis:     Post-Op Diagnosis Codes:     * Lumbar post-laminectomy syndrome (M96.1)     * Lumbar degenerative disc disease (M51.36)     * Lumbar neuritis (M54.16)    Sedation:  none    A lumbar epidural steroid injection with fluoroscopic guidance was performed.  Under fluoroscopic guidance, the epidural space was identified and accessed, confirmed by loss of resistance to saline.  The above medications were injected uneventfully.    Prep:  Pt Position:prone  Sterile Tech:cap, gloves, mask and sterile barrier  Prep:chlorhexidine gluconate and isopropyl alcohol  Monitoring:blood pressure monitoring, continuous pulse oximetry and EKG  Procedure:  Sedation: no   Approach:right paramedian  Guidance: fluoroscopy  Location:lumbar  Level:3-4  Needle Type:Tuohy  Needle Gauge:20  Aspiration:negative  Medications:  Depomedrol:80  Preservative Free Saline:1mL    Post Assessment:  Pt Tolerance:patient tolerated the procedure well with no apparent complications  Complications:no

## 2018-10-04 NOTE — H&P
INTERVAL HISTORY:    The patient returns for another Lumbar epidural steroid injection today.  They have received 30% improvement since their last injection with a pain level of 5/10 at its worst recently.    Conservative measures tried in the interim Neurontin magnesium CBD oil NSAIDs and daily swimming and water exercise    Current Outpatient Prescriptions on File Prior to Encounter   Medication Sig Dispense Refill   • CLIMARA 0.1 MG/24HR patch      • Estradiol (ESTRADERM TD) Place 1 film on the skin 1 (one) time per week. Estraderm 0.1 mg/24 hours twice weekly transdermal film, extended release     • gabapentin (NEURONTIN) 300 MG capsule Take 1 capsule by mouth 2 (Two) Times a Day. 60 capsule 5   • loratadine (CLARITIN) 10 MG tablet Take 1 tablet by mouth Daily. TAKE 1 TABLET BY MOUTH EVERY DAY 90 tablet 2   • magnesium oxide (MAGOX) 400 (241.3 MG) MG tablet tablet Take 1 tablet by mouth Daily. TAKE 1 TABLET BY MOUTH EVERY DAY 90 each 2   • rizatriptan (MAXALT) 10 MG tablet Take 1 tablet by mouth 1 (One) Time As Needed for Migraine for up to 9 doses. TAKE 1 TABLET BY MOUTH AT ONSET OF SYMPTOMS. 9 tablet 5   • zolpidem (AMBIEN) 10 MG tablet Take 1 tablet by mouth At Night As Needed (may use prn when traveling). 30 tablet 0     No current facility-administered medications on file prior to encounter.        Past Medical History:   Diagnosis Date   • Allergic    • Endometriosis     Followed by Dr. Cervantes. S/p hysterectomy 12/07.   • March fracture     Marche fracture. healing well. Followed by Dr. Flores.   • Menopausal symptoms     Will refil estradiol.   • Migraine     Will start Frova and she will also try Excedrin migraine.   • Mitral regurgitation     Trivial mitral regurg. No antibiotics needed.   • Sciatica     Sciatica and s/p disc surgery at L5-S1. Stable now with occasional bouts of sciatica.       No hematologic infectious or constitutional symptoms    Exam:  /69 (BP Location: Left arm, Patient  "Position: Lying)   Pulse 63   Temp 36.3 °C (97.4 °F) (Oral)   Resp 16   Ht 175.3 cm (69.02\")   Wt 67.1 kg (148 lb)   SpO2 98%   BMI 21.85 kg/m²   Airway Mallampatti 2  Alert and oriented      Diagnosis:  Post-Op Diagnosis Codes:     * Lumbar post-laminectomy syndrome [M96.1]     * Lumbar degenerative disc disease [M51.36]     * Lumbar neuritis [M54.16]    Plan:  Lumbar 3 epidural steroid injection under fluoroscopic guidance    I have encouraged them to continue:  1.  Physical therapy exercises at home as prescribed by physical therapy or from the pain clinic handout (given to the patient).  Continuation of these exercises every day, or multiple times per week, even when the patient has good pain relief, was stressed to the patient as a preventative measure to decrease the frequency and severity of future pain episodes.  2.  Continue pain medicines as already prescribed.  If patient not currently taking any, it is recommended to begin Acetaminophen 1000 mg po q 8 hours.  If other medicines containing Acetaminophen are currently prescribed, maintain daily dose at 3000mg.    3.  If they can tolerate NSAIDS, it is recommended to take Ibuprofen 600 mg po q 6 hours for 7 days during pain exacerbations.   Alternatively, they may substitute an NSAID of their choice (e.g. Aleve)  4.  Heat and ice to the affected area as tolerated for pain control.  It was discussed that heating pads can cause burns.  5.  Low impact exercise such as walking or water exercise was recommended to maintain overall health and aid in weight control.   6.  Follow up as needed for subsequent injections.  7.  Patient was counseled to abstain from tobacco products.    "

## 2018-10-10 ENCOUNTER — OFFICE VISIT (OUTPATIENT)
Dept: NEUROSURGERY | Facility: CLINIC | Age: 63
End: 2018-10-10

## 2018-10-10 VITALS
BODY MASS INDEX: 21.92 KG/M2 | HEIGHT: 69 IN | WEIGHT: 148 LBS | SYSTOLIC BLOOD PRESSURE: 120 MMHG | DIASTOLIC BLOOD PRESSURE: 82 MMHG

## 2018-10-10 DIAGNOSIS — M48.062 SPINAL STENOSIS, LUMBAR REGION, WITH NEUROGENIC CLAUDICATION: Primary | ICD-10-CM

## 2018-10-10 DIAGNOSIS — M51.36 DDD (DEGENERATIVE DISC DISEASE), LUMBAR: ICD-10-CM

## 2018-10-10 PROCEDURE — 99213 OFFICE O/P EST LOW 20 MIN: CPT | Performed by: PHYSICIAN ASSISTANT

## 2018-10-22 ENCOUNTER — TELEPHONE (OUTPATIENT)
Dept: NEUROSURGERY | Facility: CLINIC | Age: 63
End: 2018-10-22

## 2018-10-22 NOTE — TELEPHONE ENCOUNTER
Patient called and wanted to speak with Paola regarding myelogram vs mayank vs other procedures due to she is up in the air about scheduling.

## 2018-10-23 NOTE — TELEPHONE ENCOUNTER
Patient called the office back wanting to know why she hasn't heard from Paola. I told her that someone called her yesterday but didn't get an answer and couldn't leave a voicemail. She states that she will keep her phone on her so that she doesn't miss the call today.

## 2018-10-24 NOTE — TELEPHONE ENCOUNTER
Pt is scheduled for last ADY tomorrow. She said she got about 70% relief total so far. She would also like to have myelogram scheduled is this ok to do or should she wait for ADY to see how much more improvement she gets before scheduling?

## 2018-10-24 NOTE — TELEPHONE ENCOUNTER
I would encourage her to give it at least till early next week if the last injection is tomorrow. If she is not feeling much better by Monday or Tuesday then we can certainly order the myelogram.

## 2018-10-25 ENCOUNTER — ANESTHESIA (OUTPATIENT)
Dept: PAIN MEDICINE | Facility: HOSPITAL | Age: 63
End: 2018-10-25

## 2018-10-25 ENCOUNTER — HOSPITAL ENCOUNTER (OUTPATIENT)
Dept: GENERAL RADIOLOGY | Facility: HOSPITAL | Age: 63
Discharge: HOME | End: 2018-10-25

## 2018-10-25 ENCOUNTER — ANESTHESIA EVENT (OUTPATIENT)
Dept: PAIN MEDICINE | Facility: HOSPITAL | Age: 63
End: 2018-10-25

## 2018-10-25 ENCOUNTER — HOSPITAL ENCOUNTER (OUTPATIENT)
Dept: PAIN MEDICINE | Facility: HOSPITAL | Age: 63
Discharge: HOME | End: 2018-10-25
Attending: ANESTHESIOLOGY | Admitting: ANESTHESIOLOGY

## 2018-10-25 VITALS
HEIGHT: 69 IN | DIASTOLIC BLOOD PRESSURE: 62 MMHG | TEMPERATURE: 97.6 F | HEART RATE: 76 BPM | OXYGEN SATURATION: 100 % | BODY MASS INDEX: 21.92 KG/M2 | SYSTOLIC BLOOD PRESSURE: 109 MMHG | WEIGHT: 148 LBS | RESPIRATION RATE: 16 BRPM

## 2018-10-25 DIAGNOSIS — R52 PAIN: ICD-10-CM

## 2018-10-25 DIAGNOSIS — M51.36 DDD (DEGENERATIVE DISC DISEASE), LUMBAR: ICD-10-CM

## 2018-10-25 PROCEDURE — 77003 FLUOROGUIDE FOR SPINE INJECT: CPT

## 2018-10-25 PROCEDURE — C1755 CATHETER, INTRASPINAL: HCPCS

## 2018-10-25 PROCEDURE — 25010000002 METHYLPREDNISOLONE PER 80 MG: Performed by: ANESTHESIOLOGY

## 2018-10-25 RX ORDER — LIDOCAINE HYDROCHLORIDE 10 MG/ML
1 INJECTION, SOLUTION INFILTRATION; PERINEURAL ONCE
Status: DISCONTINUED | OUTPATIENT
Start: 2018-10-25 | End: 2018-10-26 | Stop reason: HOSPADM

## 2018-10-25 RX ORDER — FENTANYL CITRATE 50 UG/ML
50 INJECTION, SOLUTION INTRAMUSCULAR; INTRAVENOUS AS NEEDED
Status: DISCONTINUED | OUTPATIENT
Start: 2018-10-25 | End: 2018-10-26 | Stop reason: HOSPADM

## 2018-10-25 RX ORDER — NAPROXEN SODIUM 220 MG
220 TABLET ORAL 2 TIMES DAILY PRN
COMMUNITY
End: 2019-04-16 | Stop reason: HOSPADM

## 2018-10-25 RX ORDER — MIDAZOLAM HYDROCHLORIDE 1 MG/ML
1 INJECTION INTRAMUSCULAR; INTRAVENOUS
Status: DISCONTINUED | OUTPATIENT
Start: 2018-10-25 | End: 2018-10-26 | Stop reason: HOSPADM

## 2018-10-25 RX ORDER — METHYLPREDNISOLONE ACETATE 80 MG/ML
80 INJECTION, SUSPENSION INTRA-ARTICULAR; INTRALESIONAL; INTRAMUSCULAR; SOFT TISSUE ONCE
Status: COMPLETED | OUTPATIENT
Start: 2018-10-25 | End: 2018-10-25

## 2018-10-25 RX ADMIN — METHYLPREDNISOLONE ACETATE 80 MG: 80 INJECTION, SUSPENSION INTRA-ARTICULAR; INTRALESIONAL; INTRAMUSCULAR; SOFT TISSUE at 10:02

## 2018-10-25 NOTE — ANESTHESIA PROCEDURE NOTES
PAIN Epidural block    Pre-sedation assessment completed: 10/25/2018 9:50 AM    Patient reassessed immediately prior to procedure    Patient location during procedure: pain clinic  Start Time: 10/25/2018 9:50 AM  Stop Time: 10/25/2018 10:04 AM  Indication:at surgeon's request and procedure for pain  Performed By  Anesthesiologist: SHAWNA FLOREZ  Preanesthetic Checklist  Completed: patient identified, site marked, surgical consent, pre-op evaluation, timeout performed, IV checked, risks and benefits discussed and monitors and equipment checked  Additional Notes  Dx:  Post-Op Diagnosis Codes:     * Lumbar degenerative disc disease (M51.36)     * Lumbar neuritis (M54.16)     * Lumbar post-laminectomy syndrome (M96.1)  80 mg depomedrol in epid    Plan : return to clinic as needed  Prep:  Pt Position:prone (prone)  Sterile Tech:cap, gloves, mask and sterile barrier  Prep:chlorhexidine gluconate and isopropyl alcohol  Monitoring:blood pressure monitoring, EKG and continuous pulse oximetry  Procedure:  Sedation: no   Approach:midline  Guidance: fluoroscopy and c arm pa and lat and loss of resistance  Location:lumbar  Level:3-4 (interlaminar)  Needle Type:PrePaybharath  Needle Gauge:20  Aspiration:negative  Medications:  Depomedrol:80 mg  Preservative Free Saline:3mL    Post Assessment:  Post-procedure: bandaid.  Pt Tolerance:patient tolerated the procedure well with no apparent complications  Complications:no

## 2018-10-25 NOTE — H&P
INTERVAL HISTORY:    The patient returns for another Lumbar epidural steroid injection 3 today.  They have received 50 % improvement since their last injection with a pain level of 1 /10 at its worst recently.    Conservative measures tried in the interim. Daily activities are still affected by the pain.    Radiology reports and/or previous notes have been reviewed and are consistent with their diagnosis of Post-Op Diagnosis Codes:     * Lumbar degenerative disc disease [M51.36]     * Lumbar neuritis [M54.16]     * Lumbar post-laminectomy syndrome [M96.1]    Alert and oriented  MP - 2  Lungs - clear  CV - rrr    Diagnosis:  Post-Op Diagnosis Codes:     * Lumbar degenerative disc disease [M51.36]     * Lumbar neuritis [M54.16]     * Lumbar post-laminectomy syndrome [M96.1]      Plan:  Lumbar epidural steroid injection under fluoroscopic guidance    I have encouraged them to continue:  1.  Physical therapy exercises at home as prescribed by physical therapy or from the pain clinic handout (given to the patient).  Continuation of these exercises every day, or multiple times per week, even when the patient has good pain relief, was stressed to the patient as a preventative measure to decrease the frequency and severity of future pain episodes.  2.  Continue pain medicines as already prescribed.  If patient not currently taking any, it is recommended to begin Acetaminophen 1000 mg po q 8 hours.  If other medicines containing Acetaminophen are currently prescribed, maintain daily dose at 3000mg.    3.  If they can tolerate NSAIDS, it is recommended to take Ibuprofen 600 mg po q 6 hours for 7 days during pain exacerbations.   Alternatively, they may substitute an NSAID of their choice (e.g. Aleve)  4.  Heat and ice to the affected area as tolerated for pain control.  It was discussed that heating pads can cause burns.  5.  Low impact exercise such as walking or water exercise was recommended to maintain overall health and  aid in weight control.   6.  Follow up as needed for subsequent injections.  7.  Patient was counseled to abstain from tobacco products.

## 2018-11-01 ENCOUNTER — TELEPHONE (OUTPATIENT)
Dept: NEUROSURGERY | Facility: CLINIC | Age: 63
End: 2018-11-01

## 2018-11-01 DIAGNOSIS — M51.36 DDD (DEGENERATIVE DISC DISEASE), LUMBAR: Primary | ICD-10-CM

## 2018-11-01 NOTE — TELEPHONE ENCOUNTER
Ms. Cooper has called requesting to go through with a myelogram. I went over the instructions for the myelogram with her.

## 2018-11-05 DIAGNOSIS — M79.662 PAIN IN BOTH LOWER LEGS: ICD-10-CM

## 2018-11-05 DIAGNOSIS — M79.661 PAIN IN BOTH LOWER LEGS: ICD-10-CM

## 2018-11-07 RX ORDER — GABAPENTIN 300 MG/1
CAPSULE ORAL
Qty: 180 CAPSULE | Refills: 2 | Status: SHIPPED | OUTPATIENT
Start: 2018-11-07 | End: 2019-01-10

## 2018-11-25 RX ORDER — LORATADINE 10 MG/1
10 TABLET ORAL DAILY PRN
COMMUNITY
End: 2019-07-05 | Stop reason: SDUPTHER

## 2018-11-28 ENCOUNTER — HOSPITAL ENCOUNTER (OUTPATIENT)
Dept: CT IMAGING | Facility: HOSPITAL | Age: 63
Discharge: HOME | End: 2018-11-28
Admitting: PHYSICIAN ASSISTANT

## 2018-11-28 ENCOUNTER — HOSPITAL ENCOUNTER (OUTPATIENT)
Dept: GENERAL RADIOLOGY | Facility: HOSPITAL | Age: 63
Discharge: HOME | End: 2018-11-28

## 2018-11-28 VITALS
OXYGEN SATURATION: 99 % | DIASTOLIC BLOOD PRESSURE: 60 MMHG | TEMPERATURE: 96.9 F | HEART RATE: 63 BPM | SYSTOLIC BLOOD PRESSURE: 95 MMHG | BODY MASS INDEX: 22.51 KG/M2 | WEIGHT: 152 LBS | HEIGHT: 69 IN | RESPIRATION RATE: 16 BRPM

## 2018-11-28 DIAGNOSIS — M51.36 DDD (DEGENERATIVE DISC DISEASE), LUMBAR: ICD-10-CM

## 2018-11-28 PROCEDURE — 72240 MYELOGRAPHY NECK SPINE: CPT

## 2018-11-28 PROCEDURE — 72131 CT LUMBAR SPINE W/O DYE: CPT

## 2018-11-28 PROCEDURE — 0 IOPAMIDOL 41 % SOLUTION: Performed by: RADIOLOGY

## 2018-11-28 PROCEDURE — 62304 MYELOGRAPHY LUMBAR INJECTION: CPT

## 2018-11-28 RX ORDER — LIDOCAINE HYDROCHLORIDE 10 MG/ML
10 INJECTION, SOLUTION INFILTRATION; PERINEURAL ONCE
Status: COMPLETED | OUTPATIENT
Start: 2018-11-28 | End: 2018-11-28

## 2018-11-28 RX ORDER — ALPRAZOLAM 0.5 MG/1
0.5 TABLET ORAL ONCE
Status: COMPLETED | OUTPATIENT
Start: 2018-11-28 | End: 2018-11-28

## 2018-11-28 RX ADMIN — IOPAMIDOL 20 ML: 408 INJECTION, SOLUTION INTRATHECAL at 09:39

## 2018-11-28 RX ADMIN — LIDOCAINE HYDROCHLORIDE 5 ML: 10 INJECTION, SOLUTION INFILTRATION; PERINEURAL at 09:38

## 2018-11-28 RX ADMIN — ALPRAZOLAM 0.5 MG: 0.5 TABLET ORAL at 08:27

## 2018-11-28 NOTE — NURSING NOTE
Patient states she is having visual disturbances consistent with her migraine headaches and she is light sensitive.  She does not have her migraine medicine with her.

## 2018-11-28 NOTE — NURSING NOTE
Verbal and written D/C instructions given to patient and .  They voice understanding and are able to teach back D/C instructions.

## 2018-11-28 NOTE — DISCHARGE INSTRUCTIONS
EDUCATION /DISCHARGE INSTRUCTIONS:  A myelogram is a special radiology procedure of the spinal cord, spinal nerves and other related structures.  You will be awake during the examination.  An area of your lower back will be cleansed with an antiseptic solution.  The physician will inject a numbing medication in your lower back.  While your back is numb, a needle will be placed in the lower back area.  A small amount of spinal fluid may be withdrawn and sent to the lab if ordered by your physician. While the needle is in the back, an injection of a contrast material (xray dye) will be given through the needle.  The contrast material will allow the physician to see the spinal cord and spinal nerves.  Once injected, the needle will be removed and a band aid will be placed over the injection site.  The table will be tilted during the process to allow the contrast material to flow to particular areas in the spine.  Following the injection and xrays, you will be taken to the CT scan where more pictures will be taken. After the procedure is finished, the contrast material will be absorbed by your body and eliminated through your kidneys.  The radiologist will study and interpret your myelogram and send the results to your physician.    Procedure risks of a myelogram include, but are not limited to:  *  Bleeding   *  seizure  *  Infection   *  Headache, possibly severe requiring  *  Contrast reaction      a blood patch  *  Nerve or cord injury  *  Paralysis and death    Benefits of the procedure:  *  Best examination for delineating pathology related to spinal cord compression from a disc and/or nerve root compression    Alternatives to the procedure:  MRI - a non invasive procedure requiring intravenous contrast injection.  Cannot be done on patients with certain pacemakers or metal in the body.  MRI risks include possible reaction to the contrast material, movement of metal located in the body.  Benefit to MRI:   Non-invasive and usually painless procedure.  THIS EDUCATION INFORMATION WAS REVIEWED PRIOR TO THE PROCEDURE AND CONSENT. Patient initials __________________Time__0806_______________    Important information following your myelogram:  *  Lie down with your head elevated no more than 2 pillows for the next 24 hours.   *  Sit up in the car going home.  Sit up to eat and use the restroom only,  for 24 hours.  *  24 HOURS COMPLETE AT ____1100____________________   *  Tomorrow, after 24 hours complete, take it easy and rest.  *  Do not drive for 48 hours following a myelogram  *  You may remove the bandage and shower in the morning  *  Increase your fluids for the next 24 hours.  Caffeinated drinks are encouraged.     Resume taking your blood thinner or Aspirin on __11/28/18 after 110    CALL YOUR PHYSICIAN FOR THE FOLLOWING:  * Pain at the injection site  * Reddness, swelling, bruising or drainage at the injection site  * A fever by mouth of 101.0  * Any new symptoms  If you have problems with a headache that is not relieved with rest and medication, please call the Radiology Triage Nurse desk  [phone]

## 2018-11-28 NOTE — H&P
Name: Linda Cooper ADMIT: 2018   : 1955  PCP: Billy Poole MD    MRN: [id number] LOS: 0 days   AGE/SEX: 63 y.o. female  ROOM: Room/bed info not found       Chief complaint   Patient is a 63 y.o. female presents for myelogram  Past Surgical History:  Past Surgical History:   Procedure Laterality Date   • HYSTERECTOMY  2007    Endometriosis. Followed by Dr. Cervantes. S/p hysterectomy .   • LUMBAR DISC SURGERY      Sciatica and s/p disc surgery at L5-S1. Stable now with occasional bouts of sciatica.       Past Medical History:  Past Medical History:   Diagnosis Date   • Allergic    • Endometriosis     Followed by Dr. Cervantes. S/p hysterectomy .   • Low back pain    • March fracture     Marche fracture. healing well. Followed by Dr. Flores.   • Menopausal symptoms     Will refil estradiol.   • Migraine     Will start Frova and she will also try Excedrin migraine.   • Mitral regurgitation     Trivial mitral regurg. No antibiotics needed.   • Sciatica     Sciatica and s/p disc surgery at L5-S1. Stable now with occasional bouts of sciatica.       Home Medications:    (Not in a hospital admission)    Allergies:  Molds & smuts and Other    Family History:  Family History   Problem Relation Age of Onset   • Hypertension Mother    • Coronary artery disease Mother    • Coronary artery disease Father    • Other Brother         Thyroid       Social History:  Social History     Tobacco Use   • Smoking status: Current Some Day Smoker     Packs/day: 0.25     Types: Cigarettes   • Smokeless tobacco: Never Used   Substance Use Topics   • Alcohol use: Yes     Comment: 4-5 drinks/week   • Drug use: No        Objective     Physical Exam:   Neuro intavt    Vital Signs  Temp:  [96.9 °F (36.1 °C)] 96.9 °F (36.1 °C)  Heart Rate:  [60-64] 60  Resp:  [16] 16  BP: ()/(58-69) 94/58    Anticipated Surgical Procedure:  myelogram    The risks, benefits and alternatives of this procedure have been  discussed with the patient or responsible party: Yes        Brian Yoder MD  11/28/18  10:27 AM

## 2018-11-29 ENCOUNTER — TELEPHONE (OUTPATIENT)
Dept: INTERVENTIONAL RADIOLOGY/VASCULAR | Facility: HOSPITAL | Age: 63
End: 2018-11-29

## 2018-12-03 ENCOUNTER — OFFICE VISIT (OUTPATIENT)
Dept: NEUROSURGERY | Facility: CLINIC | Age: 63
End: 2018-12-03

## 2018-12-03 ENCOUNTER — TRANSCRIBE ORDERS (OUTPATIENT)
Dept: NEUROSURGERY | Facility: CLINIC | Age: 63
End: 2018-12-03

## 2018-12-03 VITALS
BODY MASS INDEX: 22.51 KG/M2 | DIASTOLIC BLOOD PRESSURE: 70 MMHG | HEIGHT: 69 IN | WEIGHT: 152 LBS | HEART RATE: 68 BPM | SYSTOLIC BLOOD PRESSURE: 120 MMHG

## 2018-12-03 DIAGNOSIS — M51.16 HERNIATION OF LUMBAR INTERVERTEBRAL DISC WITH RADICULOPATHY: ICD-10-CM

## 2018-12-03 DIAGNOSIS — M51.36 DDD (DEGENERATIVE DISC DISEASE), LUMBAR: ICD-10-CM

## 2018-12-03 DIAGNOSIS — M48.062 SPINAL STENOSIS, LUMBAR REGION, WITH NEUROGENIC CLAUDICATION: Primary | ICD-10-CM

## 2018-12-03 PROCEDURE — 99214 OFFICE O/P EST MOD 30 MIN: CPT | Performed by: NEUROLOGICAL SURGERY

## 2018-12-04 ENCOUNTER — TRANSCRIBE ORDERS (OUTPATIENT)
Dept: NEUROSURGERY | Facility: CLINIC | Age: 63
End: 2018-12-04

## 2018-12-15 DIAGNOSIS — G43.109 MIGRAINE WITH AURA AND WITHOUT STATUS MIGRAINOSUS, NOT INTRACTABLE: ICD-10-CM

## 2018-12-17 RX ORDER — RIZATRIPTAN BENZOATE 10 MG/1
TABLET ORAL
Qty: 9 TABLET | Refills: 0 | Status: SHIPPED | OUTPATIENT
Start: 2018-12-17 | End: 2019-01-10

## 2019-01-02 ENCOUNTER — TELEPHONE (OUTPATIENT)
Dept: NEUROSURGERY | Facility: CLINIC | Age: 64
End: 2019-01-02

## 2019-01-02 DIAGNOSIS — M51.16 HERNIATION OF LUMBAR INTERVERTEBRAL DISC WITH RADICULOPATHY: Primary | ICD-10-CM

## 2019-01-02 NOTE — TELEPHONE ENCOUNTER
Pt called c/o increased hip, groin and spasms.  She is wanting to know if she could have something for pain.  She is scheduled for surgery 1/17/19.  She had a MDP in Aug which helped in the past. Can we send her in a MDP?

## 2019-01-03 RX ORDER — METHYLPREDNISOLONE 4 MG/1
TABLET ORAL
Qty: 21 TABLET | Refills: 0 | Status: SHIPPED | OUTPATIENT
Start: 2019-01-03 | End: 2019-01-10

## 2019-01-10 ENCOUNTER — HOSPITAL ENCOUNTER (OUTPATIENT)
Dept: GENERAL RADIOLOGY | Facility: HOSPITAL | Age: 64
End: 2019-01-10

## 2019-01-10 ENCOUNTER — APPOINTMENT (OUTPATIENT)
Dept: PREADMISSION TESTING | Facility: HOSPITAL | Age: 64
End: 2019-01-10

## 2019-01-10 VITALS
OXYGEN SATURATION: 99 % | WEIGHT: 160 LBS | SYSTOLIC BLOOD PRESSURE: 107 MMHG | TEMPERATURE: 96.5 F | BODY MASS INDEX: 23.7 KG/M2 | RESPIRATION RATE: 18 BRPM | HEART RATE: 69 BPM | DIASTOLIC BLOOD PRESSURE: 63 MMHG | HEIGHT: 69 IN

## 2019-01-10 LAB
ANION GAP SERPL CALCULATED.3IONS-SCNC: 9.6 MMOL/L
APTT PPP: 24.4 SECONDS (ref 22.7–35.4)
BACTERIA UR QL AUTO: NORMAL /HPF
BASOPHILS # BLD AUTO: 0.04 10*3/MM3 (ref 0–0.2)
BASOPHILS NFR BLD AUTO: 0.8 % (ref 0–1.5)
BILIRUB UR QL STRIP: NEGATIVE
BUN BLD-MCNC: 14 MG/DL (ref 8–23)
BUN/CREAT SERPL: 17.9 (ref 7–25)
CALCIUM SPEC-SCNC: 9.7 MG/DL (ref 8.6–10.5)
CHLORIDE SERPL-SCNC: 98 MMOL/L (ref 98–107)
CLARITY UR: CLEAR
CO2 SERPL-SCNC: 28.4 MMOL/L (ref 22–29)
COLOR UR: YELLOW
CREAT BLD-MCNC: 0.78 MG/DL (ref 0.57–1)
DEPRECATED RDW RBC AUTO: 46.1 FL (ref 37–54)
EOSINOPHIL # BLD AUTO: 0.34 10*3/MM3 (ref 0–0.7)
EOSINOPHIL NFR BLD AUTO: 6.5 % (ref 0.3–6.2)
ERYTHROCYTE [DISTWIDTH] IN BLOOD BY AUTOMATED COUNT: 13.3 % (ref 11.7–13)
GFR SERPL CREATININE-BSD FRML MDRD: 75 ML/MIN/1.73
GLUCOSE BLD-MCNC: 94 MG/DL (ref 65–99)
GLUCOSE UR STRIP-MCNC: NEGATIVE MG/DL
HCT VFR BLD AUTO: 42.1 % (ref 35.6–45.5)
HGB BLD-MCNC: 13.7 G/DL (ref 11.9–15.5)
HGB UR QL STRIP.AUTO: NEGATIVE
HYALINE CASTS UR QL AUTO: NORMAL /LPF
IMM GRANULOCYTES # BLD AUTO: 0 10*3/MM3 (ref 0–0.03)
IMM GRANULOCYTES NFR BLD AUTO: 0 % (ref 0–0.5)
INR PPP: 0.95 (ref 0.9–1.1)
KETONES UR QL STRIP: NEGATIVE
LEUKOCYTE ESTERASE UR QL STRIP.AUTO: NEGATIVE
LYMPHOCYTES # BLD AUTO: 2.51 10*3/MM3 (ref 0.9–4.8)
LYMPHOCYTES NFR BLD AUTO: 47.8 % (ref 19.6–45.3)
MCH RBC QN AUTO: 30.9 PG (ref 26.9–32)
MCHC RBC AUTO-ENTMCNC: 32.5 G/DL (ref 32.4–36.3)
MCV RBC AUTO: 95 FL (ref 80.5–98.2)
MONOCYTES # BLD AUTO: 0.44 10*3/MM3 (ref 0.2–1.2)
MONOCYTES NFR BLD AUTO: 8.4 % (ref 5–12)
NEUTROPHILS # BLD AUTO: 1.92 10*3/MM3 (ref 1.9–8.1)
NEUTROPHILS NFR BLD AUTO: 36.5 % (ref 42.7–76)
NITRITE UR QL STRIP: NEGATIVE
PH UR STRIP.AUTO: 6 [PH] (ref 5–8)
PLATELET # BLD AUTO: 303 10*3/MM3 (ref 140–500)
PMV BLD AUTO: 9.5 FL (ref 6–12)
POTASSIUM BLD-SCNC: 4.7 MMOL/L (ref 3.5–5.2)
PROT UR QL STRIP: NEGATIVE
PROTHROMBIN TIME: 12.5 SECONDS (ref 11.7–14.2)
RBC # BLD AUTO: 4.43 10*6/MM3 (ref 3.9–5.2)
RBC # UR: NORMAL /HPF
REF LAB TEST METHOD: NORMAL
SODIUM BLD-SCNC: 136 MMOL/L (ref 136–145)
SP GR UR STRIP: 1.01 (ref 1–1.03)
SQUAMOUS #/AREA URNS HPF: NORMAL /HPF
UROBILINOGEN UR QL STRIP: NORMAL
WBC NRBC COR # BLD: 5.25 10*3/MM3 (ref 4.5–10.7)
WBC UR QL AUTO: NORMAL /HPF

## 2019-01-10 PROCEDURE — 93005 ELECTROCARDIOGRAM TRACING: CPT

## 2019-01-10 PROCEDURE — 93010 ELECTROCARDIOGRAM REPORT: CPT | Performed by: INTERNAL MEDICINE

## 2019-01-10 PROCEDURE — 36415 COLL VENOUS BLD VENIPUNCTURE: CPT

## 2019-01-10 PROCEDURE — 85610 PROTHROMBIN TIME: CPT | Performed by: NEUROLOGICAL SURGERY

## 2019-01-10 PROCEDURE — 85025 COMPLETE CBC W/AUTO DIFF WBC: CPT | Performed by: NEUROLOGICAL SURGERY

## 2019-01-10 PROCEDURE — 80048 BASIC METABOLIC PNL TOTAL CA: CPT | Performed by: NEUROLOGICAL SURGERY

## 2019-01-10 PROCEDURE — 85730 THROMBOPLASTIN TIME PARTIAL: CPT | Performed by: NEUROLOGICAL SURGERY

## 2019-01-10 PROCEDURE — 81001 URINALYSIS AUTO W/SCOPE: CPT | Performed by: NEUROLOGICAL SURGERY

## 2019-01-10 RX ORDER — RIZATRIPTAN BENZOATE 10 MG/1
10 TABLET ORAL ONCE AS NEEDED
COMMUNITY
End: 2019-06-05 | Stop reason: SDUPTHER

## 2019-01-10 RX ORDER — GABAPENTIN 300 MG/1
300 CAPSULE ORAL 2 TIMES DAILY
COMMUNITY
End: 2019-06-06 | Stop reason: SDUPTHER

## 2019-01-10 NOTE — DISCHARGE INSTRUCTIONS
Take the following medications the morning of surgery with a small sip of water: NONE    ARRIVE AT 1130    General Instructions:  • Do not eat solid food after midnight the night before surgery.  • You may drink clear liquids day of surgery but must stop at least one hour before your hospital arrival time.  • It is beneficial for you to have a clear drink that contains carbohydrates the day of surgery.  We suggest a 12 to 20 ounce bottle of Gatorade or Powerade for non-diabetic patients or a 12 to 20 ounce bottle of G2 or Powerade Zero for diabetic patients. (Pediatric patients, are not advised to drink a 12 to 20 ounce carbohydrate drink)    Clear liquids are liquids you can see through.  Nothing red in color.     Plain water                               Sports drinks  Sodas                                   Gelatin (Jell-O)  Fruit juices without pulp such as white grape juice and apple juice  Popsicles that contain no fruit or yogurt  Tea or coffee (no cream or milk added)  Gatorade / Powerade  G2 / Powerade Zero    • Infants may have breast milk up to four hours before surgery.  • Infants drinking formula may drink formula up to six hours before surgery.   • Patients who avoid smoking, chewing tobacco and alcohol for 4 weeks prior to surgery have a reduced risk of post-operative complications.  Quit smoking as many days before surgery as you can.  • Do not smoke, use chewing tobacco or drink alcohol the day of surgery.   • If applicable bring your C-PAP/ BI-PAP machine.  • Bring any papers given to you in the doctor’s office.  • Wear clean comfortable clothes and socks.  • Do not wear contact lenses or make-up.  Bring a case for your glasses.   • Bring crutches or walker if applicable.  • Remove all piercings.  Leave jewelry and any other valuables at home.  • Hair extensions with metal clips must be removed prior to surgery.  • The Pre-Admission Testing nurse will instruct you to bring medications if unable to  obtain an accurate list in Pre-Admission Testing.            Preventing a Surgical Site Infection:  • For 2 to 3 days before surgery, avoid shaving with a razor because the razor can irritate skin and make it easier to develop an infection.    • Any areas of open skin can increase the risk of a post-operative wound infection by allowing bacteria to enter and travel throughout the body.  Notify your surgeon if you have any skin wounds / rashes even if it is not near the expected surgical site.  The area will need assessed to determine if surgery should be delayed until it is healed.  • The night prior to surgery sleep in a clean bed with clean clothing.  Do not allow pets to sleep with you.  • Shower on the morning of surgery using a fresh bar of anti-bacterial soap (such as Dial) and clean washcloth.  Dry with a clean towel and dress in clean clothing.  • Ask your surgeon if you will be receiving antibiotics prior to surgery.  • Make sure you, your family, and all healthcare providers clean their hands with soap and water or an alcohol based hand  before caring for you or your wound.    Day of surgery:  Upon arrival, a Pre-op nurse and Anesthesiologist will review your health history, obtain vital signs, and answer questions you may have.  The only belongings needed at this time will be your home medications and if applicable your C-PAP/BI-PAP machine.  If you are staying overnight your family can leave the rest of your belongings in the car and bring them to your room later.  A Pre-op nurse will start an IV and you may receive medication in preparation for surgery, including something to help you relax.  Your family will be able to see you in the Pre-op area.  While you are in surgery your family should notify the waiting room  if they leave the waiting room area and provide a contact phone number.    Please be aware that surgery does come with discomfort.  We want to make every effort to  control your discomfort so please discuss any uncontrolled symptoms with your nurse.   Your doctor will most likely have prescribed pain medications.      If you are going home after surgery you will receive individualized written care instructions before being discharged.  A responsible adult must drive you to and from the hospital on the day of your surgery and stay with you for 24 hours.    If you are staying overnight following surgery, you will be transported to your hospital room following the recovery period.  [facility] has all private rooms.    You have received a list of surgical assistants for your reference.  If you have any questions please call Pre-Admission Testing at [phone].  Deductibles and co-payments are collected on the day of service. Please be prepared to pay the required co-pay, deductible or deposit on the day of service as defined by your plan.

## 2019-01-11 ENCOUNTER — TELEPHONE (OUTPATIENT)
Dept: NEUROSURGERY | Facility: CLINIC | Age: 64
End: 2019-01-11

## 2019-01-11 NOTE — TELEPHONE ENCOUNTER
Dr DUEÑAS rec'd application for [employer] at , per Dr DUEÑAS he will complete and is good for 3 months starting on 1.17.19 day of surgery.    Forms faxed, LM for patient letting her know and originals mailed to patient

## 2019-01-16 ENCOUNTER — HOSPITAL ENCOUNTER (OUTPATIENT)
Dept: GENERAL RADIOLOGY | Facility: HOSPITAL | Age: 64
Discharge: HOME | End: 2019-01-16
Admitting: NEUROLOGICAL SURGERY

## 2019-01-16 PROCEDURE — 71046 X-RAY EXAM CHEST 2 VIEWS: CPT

## 2019-01-17 ENCOUNTER — APPOINTMENT (OUTPATIENT)
Dept: GENERAL RADIOLOGY | Facility: HOSPITAL | Age: 64
End: 2019-01-17

## 2019-01-17 ENCOUNTER — HOSPITAL ENCOUNTER (OUTPATIENT)
Facility: HOSPITAL | Age: 64
Discharge: HOME | End: 2019-01-20
Attending: NEUROLOGICAL SURGERY | Admitting: NEUROLOGICAL SURGERY

## 2019-01-17 ENCOUNTER — ANESTHESIA (OUTPATIENT)
Dept: PERIOP | Facility: HOSPITAL | Age: 64
End: 2019-01-17

## 2019-01-17 ENCOUNTER — ANESTHESIA EVENT (OUTPATIENT)
Dept: PERIOP | Facility: HOSPITAL | Age: 64
End: 2019-01-17

## 2019-01-17 DIAGNOSIS — M51.16 HERNIATION OF LUMBAR INTERVERTEBRAL DISC WITH RADICULOPATHY: ICD-10-CM

## 2019-01-17 DIAGNOSIS — R53.1 GENERALIZED WEAKNESS: Primary | ICD-10-CM

## 2019-01-17 DIAGNOSIS — M48.062 SPINAL STENOSIS, LUMBAR REGION, WITH NEUROGENIC CLAUDICATION: ICD-10-CM

## 2019-01-17 PROBLEM — M48.061 STENOSIS, SPINAL, LUMBAR: Status: ACTIVE | Noted: 2019-01-17

## 2019-01-17 PROCEDURE — 25010000002 DEXAMETHASONE PER 1 MG: Performed by: NURSE ANESTHETIST, CERTIFIED REGISTERED

## 2019-01-17 PROCEDURE — 63048 LAM FACETEC &FORAMOT EA ADDL: CPT | Performed by: NEUROLOGICAL SURGERY

## 2019-01-17 PROCEDURE — 25010000002 MIDAZOLAM PER 1 MG: Performed by: NURSE ANESTHETIST, CERTIFIED REGISTERED

## 2019-01-17 PROCEDURE — 25010000002 ONDANSETRON PER 1 MG: Performed by: NURSE ANESTHETIST, CERTIFIED REGISTERED

## 2019-01-17 PROCEDURE — 63047 LAM FACETEC & FORAMOT LUMBAR: CPT | Performed by: NEUROLOGICAL SURGERY

## 2019-01-17 PROCEDURE — 25010000002 FENTANYL CITRATE (PF) 100 MCG/2ML SOLUTION: Performed by: NURSE ANESTHETIST, CERTIFIED REGISTERED

## 2019-01-17 PROCEDURE — 25010000003 CEFAZOLIN IN DEXTROSE 2-4 GM/100ML-% SOLUTION: Performed by: NEUROLOGICAL SURGERY

## 2019-01-17 PROCEDURE — 25010000002 PROPOFOL 10 MG/ML EMULSION: Performed by: NURSE ANESTHETIST, CERTIFIED REGISTERED

## 2019-01-17 PROCEDURE — 76000 FLUOROSCOPY <1 HR PHYS/QHP: CPT

## 2019-01-17 PROCEDURE — 25010000002 MIDAZOLAM PER 1 MG: Performed by: ANESTHESIOLOGY

## 2019-01-17 PROCEDURE — 25010000002 FENTANYL CITRATE (PF) 100 MCG/2ML SOLUTION: Performed by: ANESTHESIOLOGY

## 2019-01-17 PROCEDURE — 72020 X-RAY EXAM OF SPINE 1 VIEW: CPT

## 2019-01-17 PROCEDURE — 25010000002 METHOCARBAMOL 1000 MG/10ML SOLUTION: Performed by: NEUROLOGICAL SURGERY

## 2019-01-17 RX ORDER — MIDAZOLAM HYDROCHLORIDE 1 MG/ML
1 INJECTION INTRAMUSCULAR; INTRAVENOUS
Status: DISCONTINUED | OUTPATIENT
Start: 2019-01-17 | End: 2019-01-17 | Stop reason: HOSPADM

## 2019-01-17 RX ORDER — CYCLOBENZAPRINE HCL 10 MG
10 TABLET ORAL 3 TIMES DAILY PRN
Status: DISCONTINUED | OUTPATIENT
Start: 2019-01-17 | End: 2019-01-20 | Stop reason: HOSPADM

## 2019-01-17 RX ORDER — CEFAZOLIN SODIUM 2 G/100ML
2 INJECTION, SOLUTION INTRAVENOUS ONCE
Status: COMPLETED | OUTPATIENT
Start: 2019-01-17 | End: 2019-01-17

## 2019-01-17 RX ORDER — LABETALOL HYDROCHLORIDE 5 MG/ML
5 INJECTION, SOLUTION INTRAVENOUS
Status: DISCONTINUED | OUTPATIENT
Start: 2019-01-17 | End: 2019-01-17 | Stop reason: HOSPADM

## 2019-01-17 RX ORDER — HYDROMORPHONE HCL IN 0.9% NACL 10 MG/50ML
PATIENT CONTROLLED ANALGESIA SYRINGE INTRAVENOUS CONTINUOUS
Status: ACTIVE | OUTPATIENT
Start: 2019-01-17 | End: 2019-01-18

## 2019-01-17 RX ORDER — DIPHENHYDRAMINE HCL 25 MG
25 CAPSULE ORAL
Status: DISCONTINUED | OUTPATIENT
Start: 2019-01-17 | End: 2019-01-17 | Stop reason: HOSPADM

## 2019-01-17 RX ORDER — PROMETHAZINE HYDROCHLORIDE 25 MG/1
25 TABLET ORAL ONCE AS NEEDED
Status: DISCONTINUED | OUTPATIENT
Start: 2019-01-17 | End: 2019-01-17 | Stop reason: HOSPADM

## 2019-01-17 RX ORDER — GLYCOPYRROLATE 0.2 MG/ML
INJECTION INTRAMUSCULAR; INTRAVENOUS AS NEEDED
Status: DISCONTINUED | OUTPATIENT
Start: 2019-01-17 | End: 2019-01-17 | Stop reason: SURG

## 2019-01-17 RX ORDER — FENTANYL CITRATE 50 UG/ML
50 INJECTION, SOLUTION INTRAMUSCULAR; INTRAVENOUS
Status: DISCONTINUED | OUTPATIENT
Start: 2019-01-17 | End: 2019-01-17 | Stop reason: HOSPADM

## 2019-01-17 RX ORDER — HYDROCODONE BITARTRATE AND ACETAMINOPHEN 7.5; 325 MG/1; MG/1
1 TABLET ORAL ONCE AS NEEDED
Status: DISCONTINUED | OUTPATIENT
Start: 2019-01-17 | End: 2019-01-17 | Stop reason: HOSPADM

## 2019-01-17 RX ORDER — LIDOCAINE HYDROCHLORIDE 20 MG/ML
INJECTION, SOLUTION INFILTRATION; PERINEURAL AS NEEDED
Status: DISCONTINUED | OUTPATIENT
Start: 2019-01-17 | End: 2019-01-17 | Stop reason: SURG

## 2019-01-17 RX ORDER — SUMATRIPTAN 100 MG/1
50 TABLET, FILM COATED ORAL ONCE
Status: DISCONTINUED | OUTPATIENT
Start: 2019-01-17 | End: 2019-01-20 | Stop reason: HOSPADM

## 2019-01-17 RX ORDER — ROCURONIUM BROMIDE 10 MG/ML
INJECTION, SOLUTION INTRAVENOUS AS NEEDED
Status: DISCONTINUED | OUTPATIENT
Start: 2019-01-17 | End: 2019-01-17 | Stop reason: SURG

## 2019-01-17 RX ORDER — NALOXONE HCL 0.4 MG/ML
0.2 VIAL (ML) INJECTION AS NEEDED
Status: DISCONTINUED | OUTPATIENT
Start: 2019-01-17 | End: 2019-01-17 | Stop reason: HOSPADM

## 2019-01-17 RX ORDER — FLUMAZENIL 0.1 MG/ML
0.2 INJECTION INTRAVENOUS AS NEEDED
Status: DISCONTINUED | OUTPATIENT
Start: 2019-01-17 | End: 2019-01-17 | Stop reason: HOSPADM

## 2019-01-17 RX ORDER — ONDANSETRON 4 MG/1
4 TABLET, ORALLY DISINTEGRATING ORAL EVERY 6 HOURS PRN
Status: DISCONTINUED | OUTPATIENT
Start: 2019-01-17 | End: 2019-01-20 | Stop reason: HOSPADM

## 2019-01-17 RX ORDER — MIDAZOLAM HYDROCHLORIDE 1 MG/ML
INJECTION INTRAMUSCULAR; INTRAVENOUS AS NEEDED
Status: DISCONTINUED | OUTPATIENT
Start: 2019-01-17 | End: 2019-01-17 | Stop reason: SURG

## 2019-01-17 RX ORDER — ONDANSETRON 2 MG/ML
4 INJECTION INTRAMUSCULAR; INTRAVENOUS EVERY 8 HOURS PRN
Status: DISCONTINUED | OUTPATIENT
Start: 2019-01-17 | End: 2019-01-20 | Stop reason: HOSPADM

## 2019-01-17 RX ORDER — MEPERIDINE HYDROCHLORIDE 25 MG/ML
12.5 INJECTION INTRAMUSCULAR; INTRAVENOUS; SUBCUTANEOUS
Status: DISCONTINUED | OUTPATIENT
Start: 2019-01-17 | End: 2019-01-17 | Stop reason: HOSPADM

## 2019-01-17 RX ORDER — SODIUM CHLORIDE 0.9 % (FLUSH) 0.9 %
1-10 SYRINGE (ML) INJECTION AS NEEDED
Status: DISCONTINUED | OUTPATIENT
Start: 2019-01-17 | End: 2019-01-17 | Stop reason: HOSPADM

## 2019-01-17 RX ORDER — SODIUM CHLORIDE 0.9 % (FLUSH) 0.9 %
3-10 SYRINGE (ML) INJECTION AS NEEDED
Status: DISCONTINUED | OUTPATIENT
Start: 2019-01-17 | End: 2019-01-20 | Stop reason: HOSPADM

## 2019-01-17 RX ORDER — SODIUM CHLORIDE, SODIUM LACTATE, POTASSIUM CHLORIDE, CALCIUM CHLORIDE 600; 310; 30; 20 MG/100ML; MG/100ML; MG/100ML; MG/100ML
80 INJECTION, SOLUTION INTRAVENOUS CONTINUOUS
Status: DISCONTINUED | OUTPATIENT
Start: 2019-01-17 | End: 2019-01-18

## 2019-01-17 RX ORDER — MIDAZOLAM HYDROCHLORIDE 1 MG/ML
2 INJECTION INTRAMUSCULAR; INTRAVENOUS
Status: DISCONTINUED | OUTPATIENT
Start: 2019-01-17 | End: 2019-01-17 | Stop reason: HOSPADM

## 2019-01-17 RX ORDER — DEXAMETHASONE SODIUM PHOSPHATE 10 MG/ML
INJECTION INTRAMUSCULAR; INTRAVENOUS AS NEEDED
Status: DISCONTINUED | OUTPATIENT
Start: 2019-01-17 | End: 2019-01-17 | Stop reason: SURG

## 2019-01-17 RX ORDER — ONDANSETRON 4 MG/1
4 TABLET, FILM COATED ORAL EVERY 6 HOURS PRN
Status: DISCONTINUED | OUTPATIENT
Start: 2019-01-17 | End: 2019-01-20 | Stop reason: HOSPADM

## 2019-01-17 RX ORDER — SENNA AND DOCUSATE SODIUM 50; 8.6 MG/1; MG/1
1 TABLET, FILM COATED ORAL NIGHTLY PRN
Status: DISCONTINUED | OUTPATIENT
Start: 2019-01-17 | End: 2019-01-20 | Stop reason: HOSPADM

## 2019-01-17 RX ORDER — FAMOTIDINE 10 MG/ML
20 INJECTION, SOLUTION INTRAVENOUS ONCE
Status: COMPLETED | OUTPATIENT
Start: 2019-01-17 | End: 2019-01-17

## 2019-01-17 RX ORDER — HYDROCODONE BITARTRATE AND ACETAMINOPHEN 7.5; 325 MG/1; MG/1
1 TABLET ORAL EVERY 4 HOURS PRN
Status: DISCONTINUED | OUTPATIENT
Start: 2019-01-17 | End: 2019-01-20 | Stop reason: HOSPADM

## 2019-01-17 RX ORDER — EPHEDRINE SULFATE 50 MG/ML
INJECTION, SOLUTION INTRAVENOUS AS NEEDED
Status: DISCONTINUED | OUTPATIENT
Start: 2019-01-17 | End: 2019-01-17 | Stop reason: SURG

## 2019-01-17 RX ORDER — SODIUM CHLORIDE, SODIUM LACTATE, POTASSIUM CHLORIDE, CALCIUM CHLORIDE 600; 310; 30; 20 MG/100ML; MG/100ML; MG/100ML; MG/100ML
9 INJECTION, SOLUTION INTRAVENOUS CONTINUOUS
Status: DISCONTINUED | OUTPATIENT
Start: 2019-01-17 | End: 2019-01-17

## 2019-01-17 RX ORDER — OXYCODONE AND ACETAMINOPHEN 7.5; 325 MG/1; MG/1
1 TABLET ORAL ONCE AS NEEDED
Status: DISCONTINUED | OUTPATIENT
Start: 2019-01-17 | End: 2019-01-17 | Stop reason: HOSPADM

## 2019-01-17 RX ORDER — LIDOCAINE HYDROCHLORIDE 40 MG/ML
SOLUTION TOPICAL AS NEEDED
Status: DISCONTINUED | OUTPATIENT
Start: 2019-01-17 | End: 2019-01-17 | Stop reason: SURG

## 2019-01-17 RX ORDER — PROMETHAZINE HYDROCHLORIDE 25 MG/ML
12.5 INJECTION, SOLUTION INTRAMUSCULAR; INTRAVENOUS ONCE AS NEEDED
Status: DISCONTINUED | OUTPATIENT
Start: 2019-01-17 | End: 2019-01-17 | Stop reason: HOSPADM

## 2019-01-17 RX ORDER — DIPHENHYDRAMINE HYDROCHLORIDE 50 MG/ML
12.5 INJECTION INTRAMUSCULAR; INTRAVENOUS
Status: DISCONTINUED | OUTPATIENT
Start: 2019-01-17 | End: 2019-01-17 | Stop reason: HOSPADM

## 2019-01-17 RX ORDER — ONDANSETRON 2 MG/ML
INJECTION INTRAMUSCULAR; INTRAVENOUS AS NEEDED
Status: DISCONTINUED | OUTPATIENT
Start: 2019-01-17 | End: 2019-01-17 | Stop reason: SURG

## 2019-01-17 RX ORDER — HYDROMORPHONE HYDROCHLORIDE 1 MG/ML
0.5 INJECTION, SOLUTION INTRAMUSCULAR; INTRAVENOUS; SUBCUTANEOUS
Status: DISCONTINUED | OUTPATIENT
Start: 2019-01-17 | End: 2019-01-17 | Stop reason: HOSPADM

## 2019-01-17 RX ORDER — ACETAMINOPHEN 325 MG/1
650 TABLET ORAL EVERY 4 HOURS PRN
Status: DISCONTINUED | OUTPATIENT
Start: 2019-01-17 | End: 2019-01-20 | Stop reason: HOSPADM

## 2019-01-17 RX ORDER — SODIUM CHLORIDE 0.9 % (FLUSH) 0.9 %
3 SYRINGE (ML) INJECTION EVERY 12 HOURS SCHEDULED
Status: DISCONTINUED | OUTPATIENT
Start: 2019-01-17 | End: 2019-01-20 | Stop reason: HOSPADM

## 2019-01-17 RX ORDER — PROMETHAZINE HYDROCHLORIDE 25 MG/1
25 SUPPOSITORY RECTAL ONCE AS NEEDED
Status: DISCONTINUED | OUTPATIENT
Start: 2019-01-17 | End: 2019-01-17 | Stop reason: HOSPADM

## 2019-01-17 RX ORDER — CEFAZOLIN SODIUM 2 G/100ML
2 INJECTION, SOLUTION INTRAVENOUS EVERY 8 HOURS
Status: COMPLETED | OUTPATIENT
Start: 2019-01-17 | End: 2019-01-18

## 2019-01-17 RX ORDER — NALOXONE HCL 0.4 MG/ML
0.1 VIAL (ML) INJECTION
Status: DISCONTINUED | OUTPATIENT
Start: 2019-01-17 | End: 2019-01-19

## 2019-01-17 RX ORDER — ACETAMINOPHEN 325 MG/1
650 TABLET ORAL ONCE AS NEEDED
Status: DISCONTINUED | OUTPATIENT
Start: 2019-01-17 | End: 2019-01-17 | Stop reason: HOSPADM

## 2019-01-17 RX ORDER — GABAPENTIN 300 MG/1
300 CAPSULE ORAL 2 TIMES DAILY
Status: DISCONTINUED | OUTPATIENT
Start: 2019-01-17 | End: 2019-01-20 | Stop reason: HOSPADM

## 2019-01-17 RX ORDER — DOCUSATE SODIUM 100 MG/1
100 CAPSULE, LIQUID FILLED ORAL 2 TIMES DAILY PRN
Status: DISCONTINUED | OUTPATIENT
Start: 2019-01-17 | End: 2019-01-18

## 2019-01-17 RX ORDER — LIDOCAINE HYDROCHLORIDE 10 MG/ML
0.5 INJECTION, SOLUTION EPIDURAL; INFILTRATION; INTRACAUDAL; PERINEURAL ONCE AS NEEDED
Status: DISCONTINUED | OUTPATIENT
Start: 2019-01-17 | End: 2019-01-17 | Stop reason: HOSPADM

## 2019-01-17 RX ORDER — PROPOFOL 10 MG/ML
VIAL (ML) INTRAVENOUS AS NEEDED
Status: DISCONTINUED | OUTPATIENT
Start: 2019-01-17 | End: 2019-01-17 | Stop reason: SURG

## 2019-01-17 RX ORDER — METHOCARBAMOL 100 MG/ML
1000 INJECTION, SOLUTION INTRAMUSCULAR; INTRAVENOUS ONCE
Status: COMPLETED | OUTPATIENT
Start: 2019-01-17 | End: 2019-01-17

## 2019-01-17 RX ORDER — ONDANSETRON 2 MG/ML
4 INJECTION INTRAMUSCULAR; INTRAVENOUS ONCE AS NEEDED
Status: COMPLETED | OUTPATIENT
Start: 2019-01-17 | End: 2019-01-17

## 2019-01-17 RX ORDER — HYDRALAZINE HYDROCHLORIDE 20 MG/ML
5 INJECTION INTRAMUSCULAR; INTRAVENOUS
Status: DISCONTINUED | OUTPATIENT
Start: 2019-01-17 | End: 2019-01-17 | Stop reason: HOSPADM

## 2019-01-17 RX ORDER — CETIRIZINE HYDROCHLORIDE 10 MG/1
10 TABLET ORAL DAILY
Status: DISCONTINUED | OUTPATIENT
Start: 2019-01-18 | End: 2019-01-20 | Stop reason: HOSPADM

## 2019-01-17 RX ORDER — SODIUM CHLORIDE, SODIUM LACTATE, POTASSIUM CHLORIDE, CALCIUM CHLORIDE 600; 310; 30; 20 MG/100ML; MG/100ML; MG/100ML; MG/100ML
100 INJECTION, SOLUTION INTRAVENOUS CONTINUOUS
Status: DISCONTINUED | OUTPATIENT
Start: 2019-01-17 | End: 2019-01-18

## 2019-01-17 RX ORDER — PROMETHAZINE HYDROCHLORIDE 25 MG/ML
5 INJECTION, SOLUTION INTRAMUSCULAR; INTRAVENOUS
Status: DISCONTINUED | OUTPATIENT
Start: 2019-01-17 | End: 2019-01-17 | Stop reason: HOSPADM

## 2019-01-17 RX ORDER — EPHEDRINE SULFATE 50 MG/ML
5 INJECTION, SOLUTION INTRAVENOUS ONCE AS NEEDED
Status: DISCONTINUED | OUTPATIENT
Start: 2019-01-17 | End: 2019-01-17 | Stop reason: HOSPADM

## 2019-01-17 RX ORDER — FENTANYL CITRATE 50 UG/ML
INJECTION, SOLUTION INTRAMUSCULAR; INTRAVENOUS AS NEEDED
Status: DISCONTINUED | OUTPATIENT
Start: 2019-01-17 | End: 2019-01-17 | Stop reason: SURG

## 2019-01-17 RX ORDER — IPRATROPIUM BROMIDE AND ALBUTEROL SULFATE 2.5; .5 MG/3ML; MG/3ML
3 SOLUTION RESPIRATORY (INHALATION) ONCE AS NEEDED
Status: DISCONTINUED | OUTPATIENT
Start: 2019-01-17 | End: 2019-01-17 | Stop reason: HOSPADM

## 2019-01-17 RX ADMIN — EPHEDRINE SULFATE 5 MG: 50 INJECTION INTRAMUSCULAR; INTRAVENOUS; SUBCUTANEOUS at 15:50

## 2019-01-17 RX ADMIN — LIDOCAINE HYDROCHLORIDE 50 MG: 20 INJECTION, SOLUTION INFILTRATION; PERINEURAL at 15:43

## 2019-01-17 RX ADMIN — HYDROCODONE BITARTRATE AND ACETAMINOPHEN 1 TABLET: 7.5; 325 TABLET ORAL at 23:03

## 2019-01-17 RX ADMIN — FENTANYL CITRATE 50 MCG: 50 INJECTION, SOLUTION INTRAMUSCULAR; INTRAVENOUS at 18:44

## 2019-01-17 RX ADMIN — FENTANYL CITRATE 50 MCG: 50 INJECTION, SOLUTION INTRAMUSCULAR; INTRAVENOUS at 14:48

## 2019-01-17 RX ADMIN — SODIUM CHLORIDE, POTASSIUM CHLORIDE, SODIUM LACTATE AND CALCIUM CHLORIDE 80 ML/HR: 600; 310; 30; 20 INJECTION, SOLUTION INTRAVENOUS at 22:02

## 2019-01-17 RX ADMIN — SODIUM CHLORIDE, POTASSIUM CHLORIDE, SODIUM LACTATE AND CALCIUM CHLORIDE 9 ML/HR: 600; 310; 30; 20 INJECTION, SOLUTION INTRAVENOUS at 14:47

## 2019-01-17 RX ADMIN — LIDOCAINE HYDROCHLORIDE 1 EACH: 40 SOLUTION TOPICAL at 15:46

## 2019-01-17 RX ADMIN — HYDROMORPHONE HYDROCHLORIDE: 10 INJECTION, SOLUTION INTRAMUSCULAR; INTRAVENOUS; SUBCUTANEOUS at 17:59

## 2019-01-17 RX ADMIN — MIDAZOLAM HYDROCHLORIDE 1 MG: 2 INJECTION, SOLUTION INTRAMUSCULAR; INTRAVENOUS at 14:48

## 2019-01-17 RX ADMIN — GLYCOPYRROLATE 0.2 MG: 0.2 INJECTION INTRAMUSCULAR; INTRAVENOUS at 15:57

## 2019-01-17 RX ADMIN — CEFAZOLIN SODIUM 2 G: 2 INJECTION, SOLUTION INTRAVENOUS at 15:37

## 2019-01-17 RX ADMIN — ONDANSETRON 4 MG: 2 INJECTION INTRAMUSCULAR; INTRAVENOUS at 17:14

## 2019-01-17 RX ADMIN — EPHEDRINE SULFATE 10 MG: 50 INJECTION INTRAMUSCULAR; INTRAVENOUS; SUBCUTANEOUS at 15:55

## 2019-01-17 RX ADMIN — EPHEDRINE SULFATE 10 MG: 50 INJECTION INTRAMUSCULAR; INTRAVENOUS; SUBCUTANEOUS at 16:05

## 2019-01-17 RX ADMIN — SODIUM CHLORIDE, POTASSIUM CHLORIDE, SODIUM LACTATE AND CALCIUM CHLORIDE: 600; 310; 30; 20 INJECTION, SOLUTION INTRAVENOUS at 16:40

## 2019-01-17 RX ADMIN — PROPOFOL 200 MG: 10 INJECTION, EMULSION INTRAVENOUS at 15:43

## 2019-01-17 RX ADMIN — ROCURONIUM BROMIDE 50 MG: 10 INJECTION INTRAVENOUS at 15:43

## 2019-01-17 RX ADMIN — FAMOTIDINE 20 MG: 10 INJECTION, SOLUTION INTRAVENOUS at 14:48

## 2019-01-17 RX ADMIN — METHOCARBAMOL 1000 MG: 100 INJECTION, SOLUTION INTRAMUSCULAR; INTRAVENOUS at 18:06

## 2019-01-17 RX ADMIN — ONDANSETRON 4 MG: 2 INJECTION INTRAMUSCULAR; INTRAVENOUS at 18:31

## 2019-01-17 RX ADMIN — CEFAZOLIN SODIUM 2 G: 2 INJECTION, SOLUTION INTRAVENOUS at 22:02

## 2019-01-17 RX ADMIN — EPHEDRINE SULFATE 10 MG: 50 INJECTION INTRAMUSCULAR; INTRAVENOUS; SUBCUTANEOUS at 16:00

## 2019-01-17 RX ADMIN — Medication 2 MG: at 15:37

## 2019-01-17 RX ADMIN — DEXAMETHASONE SODIUM PHOSPHATE 10 MG: 10 INJECTION INTRAMUSCULAR; INTRAVENOUS at 15:49

## 2019-01-17 RX ADMIN — FENTANYL CITRATE 50 MCG: 50 INJECTION INTRAMUSCULAR; INTRAVENOUS at 15:37

## 2019-01-17 RX ADMIN — SODIUM CHLORIDE, PRESERVATIVE FREE 3 ML: 5 INJECTION INTRAVENOUS at 22:06

## 2019-01-17 RX ADMIN — SUGAMMADEX 145 MG: 100 INJECTION, SOLUTION INTRAVENOUS at 17:12

## 2019-01-17 RX ADMIN — FENTANYL CITRATE 50 MCG: 50 INJECTION, SOLUTION INTRAMUSCULAR; INTRAVENOUS at 17:58

## 2019-01-17 NOTE — PERIOPERATIVE NURSING NOTE
calledd into OR 21 to give repport , Geneva answered phone and said to please calll back.  I called back a few minutes later.  No one answered the phone.  I was not able to give report to OR RN.

## 2019-01-17 NOTE — ANESTHESIA POSTPROCEDURE EVALUATION
"Patient: Linda Cooper    Procedure Summary     Date:  01/17/19 Room / Location:  [facility] OR [address] [place] [address] MAIN OR    Anesthesia Start:  1537 Anesthesia Stop:  1747    Procedure:  L2-4 OPEN BILATERAL LUMBAR DECOMPRESSION AND RT L2/3 DISCECTOMY POSTERIOR (Bilateral Spine Lumbar) Diagnosis:       DDD (degenerative disc disease), lumbar      Spinal stenosis, lumbar region, with neurogenic claudication      Herniation of lumbar intervertebral disc with radiculopathy      (DDD (degenerative disc disease), lumbar [M51.36])      (Spinal stenosis, lumbar region, with neurogenic claudication [M48.062])      (Herniation of lumbar intervertebral disc with radiculopathy [M51.16])    Surgeon:  Edson Camarillo MD Provider:  Lizzy Babb MD    Anesthesia Type:  general ASA Status:  2          Anesthesia Type: general  Last vitals  BP   116/61 (01/17/19 1755)   Temp   36.7 °C (98 °F) (01/17/19 1745)   Pulse   83 (01/17/19 1755)   Resp   16 (01/17/19 1755)     SpO2   99 % (01/17/19 1755)     Post Anesthesia Care and Evaluation    Patient location during evaluation: bedside  Patient participation: complete - patient participated  Level of consciousness: awake and alert  Pain management: adequate  Airway patency: patent  Anesthetic complications: No anesthetic complications    Cardiovascular status: acceptable  Respiratory status: acceptable  Hydration status: acceptable    Comments: /61   Pulse 83   Temp 36.7 °C (98 °F) (Oral)   Resp 16   Ht 175.3 cm (69.02\")   Wt 72.3 kg (159 lb 6 oz)   SpO2 99%   BMI 23.52 kg/m²           "

## 2019-01-17 NOTE — ANESTHESIA PREPROCEDURE EVALUATION
Anesthesia Evaluation     Patient summary reviewed and Nursing notes reviewed   NPO Solid Status: > 8 hours  NPO Liquid Status: > 4 hours           Airway   Mallampati: I  Dental      Pulmonary - normal exam    breath sounds clear to auscultation  Cardiovascular - normal exam    ECG reviewed    (+) valvular problems/murmurs MR,       Neuro/Psych  (+) numbness,     GI/Hepatic/Renal/Endo      Musculoskeletal     (+) back pain,   Abdominal    Substance History      OB/GYN          Other   (+) arthritis                     Anesthesia Plan    ASA 2     general     intravenous induction   Anesthetic plan, all risks, benefits, and alternatives have been provided, discussed and informed consent has been obtained with: patient.

## 2019-01-17 NOTE — OP NOTE
Preoperative diagnosis: 1. Spinal stenosis L2/3, L3/4 with neurogenic claudication; 2. Superimposed right L2/3 herniated disc    Postoperative diagnosis: Same as above    Procedures performed: Open lumbar decompression L2/3, L3/4 with right sided L2/3 discectomy    Surgeon: Marquise    First Assistant: Brisa Banks  (She greatly assisted in the exposure, visualization of neural structures, control of bleeding, retraction, and closure of the incision.)    Anesthesia: GET    EBL: 50 cc    Complications: none    Specimen sent: none    Drains: 7 mm flat RUDDY epidural drain    Findings: severe stenosis with superimposed herniated disc    Postoperative condition: good    Indications for the operation:The patient is a healthy 63-year-old  who has had 2 previous surgeries for disk herniations at L5-S1 and L4-L5. She developed over the last year or so severe radiating right buttock and leg pain and more recently some left-sided pain and was found to have severe spinal stenosis at L2-L3 and L3-L4 with a superimposed herniated disk at the right L3-L4 level. Because of the failure of conservative treatments and the intractability of her mostly radicular pain, she was brought to the operating room for a decompression and diskectomy.         Informed consent: She understood that the goal of surgery is relief of radiating leg pain with improvement of numbness, tingling, walking, and quality of life.  This will not help or hinder low back pain.  The risks include, but are not limited to, infection, hemorrhage on transfusion or reoperation, CSF leak requiring reoperation, incomplete relief of symptoms, potential need for additional surgeries in the future including a fusion, stroke, paralysis, coma, and death.  The patient agrees to proceed.    Details of the operation:The patient was taken to the operating room and remained on her gurney in the supine position. After induction and endotracheal intubation she  got 2 g of Kefzol as per the SCIP protocol. No Hernandez was needed. SCDs were placed. Venous access was secured. She was rolled over in the prone position on the radiolucent Jason spinal table. All pressure points were padded including the brachial plexus. The lumbar region was then prepped and draped in the usual sterile fashion. We did a surgical timeout. I injected 20 mL of 0.25% Marcaine with epinephrine into the paraspinous musculature bilaterally at L2 to L4. A linear incision was made from L2 to L4 above the old incision with a #10 skin knife. Hemostasis was obtained with monopolar cautery. Dissection was taken all the way down to the lumbar fascia which was divided to the left and to the right at L2, L3, and L4. A Shields periosteal was used to strip the paraspinous musculature out laterally. Self-retaining retractors were placed. Initial x-rays showed that the towel clips were below the L3-L4 and below the L2-L3 disk space. We eventually got another picture where the needle was at the L2-L3 disk space. The spinous processes at L2, L3, and L4 were removed. Using a 6 mm round hansa I did a central laminectomy all the way down to the ligamentum flavum at L2-L3 and L3-L4. The microscope was draped and brought into the field. Its use was essential to the performance of microneurosurgical technique. Using the 3 mm Matchstick on the pneumatic Responsa air drill I completed the laminectomy at L3-L4 and L2-L3 and did medial facetectomies and foraminotomies bilaterally, decompressing the L3 and the L4 roots respectively. At the right L2-L3 level there was a superimposed disk extrusion. I incised the annulus and removed the extruded portion as well as did an internal decompression of the disk material using reverse angle curettes, pituitaries, and even the drill itself. this further decompressed the right L3 and even the right L2 nerve root. The other disk levels on the other side and at the L3-L4 level were just protruding  and did not require incision. Hemostasis was obtained. Antibiotic irrigation was used. No CSF leakage was seen. A 7 mm flat Jason-Orozco drain was left in the epidural space and exited through a separate stab incision and secured to the skin with 2-0 silk. Floseal was used. The fascia was reapproximated with 0 Vicryl interrupted suture. The subcutaneous layer was closed with 2-0 interrupted Vicryl suture. The skin was closed with a running 4-0 Vicryl subcuticular. Steri-Strips and a clean, dry dressing were placed. The patient tolerated the procedure well. There were no complications. All counts were correct. She was rolled over in the supine position, extubated, and taken to the recovery room in satisfactory condition.

## 2019-01-17 NOTE — BRIEF OP NOTE
LUMBAR LAMINECTOMY DISCECTOMY DECOMPRESSION POSTERIOR 1-2 LEVELS  Progress Note    Linda Cooper  1/17/2019    Pre-op Diagnosis:   DDD (degenerative disc disease), lumbar [M51.36]  Spinal stenosis, lumbar region, with neurogenic claudication [M48.062] L2/3, L3/4  Herniation of lumbar intervertebral disc with radiculopathy [M51.16] right L2/3       Post-Op Diagnosis Codes:     * DDD (degenerative disc disease), lumbar [M51.36]     * Spinal stenosis, lumbar region, with neurogenic claudication [M48.062]     * Herniation of lumbar intervertebral disc with radiculopathy [M51.16]    Procedure/CPT® Codes:      Procedure(s):  L2-4 OPEN BILATERAL LUMBAR DECOMPRESSION AND RT L2/3 DISCECTOMY POSTERIOR    Surgeon(s):  Edson Camarillo MD    Anesthesia: General    Staff:   Circulator: Gopal Rubio RN; Joann Barden RN; Edith Caputo RN  Radiology Technologist: Niesha Shirley, RRT  Scrub Person: Gayla Claire  Assistant: Brisa Banks CSA; Whitley Benites CSA    Estimated Blood Loss: 50 cc    Urine Voided: none    Specimens:              none      Drains:   Closed/Suction Drain Back Bulb (Active)       Findings: severe stenosis, superimposed herniated disc    Complications: none      Edson Camarillo MD     Date: 1/17/2019  Time: 5:25 PM

## 2019-01-17 NOTE — ANESTHESIA PROCEDURE NOTES
Airway  Urgency: elective    Airway not difficult    General Information and Staff    Patient location during procedure: OR  Anesthesiologist: Lizzy Babb MD  CRNA: Cristina Shearer CRNA    Indications and Patient Condition  Indications for airway management: airway protection    Preoxygenated: yes  MILS maintained throughout  Mask difficulty assessment: 1 - vent by mask    Final Airway Details  Final airway type: endotracheal airway      Successful airway: ETT  Cuffed: yes   Successful intubation technique: direct laryngoscopy  Facilitating devices/methods: intubating stylet  Endotracheal tube insertion site: oral  Blade: Jasper  Blade size: 3  ETT size (mm): 7.0  Cormack-Lehane Classification: grade I - full view of glottis  Placement verified by: chest auscultation and capnometry   Cuff volume (mL): 6  Measured from: lips  ETT to lips (cm): 21  Number of attempts at approach: 1    Additional Comments  Atraumatic ET Tube placement.  Teeth as pre-op. BLEBS.  -ABD sounds.  +ET CO2.  Secured to face

## 2019-01-17 NOTE — H&P
Office Visit     12/3/2018  [facility] NEUROSURGERY   Edson Camarillo MD   Neurosurgery   Spinal stenosis, lumbar region, with neurogenic claudication +2 more   Dx   Leg Pain     Reason for Visit    Progress Notes     Expand All Collapse All       Subjective      Patient ID: Linda Cooper is a 63 y.o. female is here today for follow-up to discuss lumbar myelogram and plain films.  Patient was last seen 10.10.18 for low back pain and right leg pain.  Patient tolerated the myelogram well, she did not get a post myelogram headache. Patient states that her right hip and right anterior thigh pain worse.  She has N/T right foot that is constant and right groin pain worse at night.  Patient states that her low back and right leg feel weak.  She has had ADY in the past that helped some. She is taking Gabapentin 300 mg hs     Leg Pain    The pain is present in the right hip and right thigh. Associated symptoms include numbness.         The following portions of the patient's history were reviewed and updated as appropriate: allergies, current medications, past family history, past medical history, past social history, past surgical history and problem list.     Review of Systems   Musculoskeletal: Positive for arthralgias and back pain.   Neurological: Positive for numbness.   All other systems reviewed and are negative.     The patient is with her . I did an open right L4-L5 microdiskectomy 6 years ago for herniated disk. She did quite well. She had previous surgery at L5-S1 in the 1990s. She did well from that. About a year ago she began having recurrent symptoms in the right leg that were different compared to before. They involved the right groin and right anterior thigh. She has some subjective weakness in her right quadriceps manifested when she climbs stairs she actually has very little low back pain. She had a little bit of numbness in the left leg but nothing terribly painful. This has  been getting progressively worse. She has spinal stenosis at L2-L3 and L3-L4 with superimposed right L2-L3 foraminal herniated disk. Blocks really did not help all that much in the long run and she is already on gabapentin. She has a positive shopping cart sign and the history is consistent more with neurogenic claudication with more symptoms being present with standing and walking. Lying down and sitting are actually generally okay. We had a long discussion about this. She is at a point where she could choose surgical intervention consisting of an open L2-L3 and L3-L4 decompression bilaterally with a right-sided L2-L3 diskectomy. She does not need to be fused. She would like to thin about it but I suspect she will move forward of this in her own time frame. She is generally healthy and does exercise regularly which I encouraged her to get back to. 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 If she decides to move forward will have her follow up with Estrella, who saw her before this visit.            Objective      Physical Exam   Constitutional: She is oriented to person, place, and time. She appears well-developed and well-nourished.   HENT:   Head: Normocephalic and atraumatic.   Eyes: Conjunctivae and EOM are normal. Pupils are equal, round, and reactive to light.   Fundoscopic exam:       The right eye shows no papilledema. The right eye shows venous pulsations.        The left eye shows no papilledema. The left eye shows venous pulsations.   Neck: Carotid bruit is not present.   Neurological: She is oriented to person, place, and time. She has a normal Finger-Nose-Finger Test and a normal Heel to Shin Test. Gait normal.   Reflex Scores:       Tricep reflexes are 2+ on the right side and 2+ on the left side.       Bicep reflexes are 2+ on the right side and 2+ on the left side.       Brachioradialis reflexes are 2+ on the right side and 2+ on the  left side.       Patellar reflexes are 2+ on the right side and 2+ on the left side.       Achilles reflexes are 2+ on the right side and 2+ on the left side.  Psychiatric: Her speech is normal.      Neurologic Exam      Mental Status   Oriented to person, place, and time.   Registration of memory: Good recent and remote memory.   Attention: normal. Concentration: normal.   Speech: speech is normal   Level of consciousness: alert  Knowledge: consistent with education.      Cranial Nerves      CN II   Visual fields full to confrontation.   Visual acuity: normal     CN III, IV, VI   Pupils are equal, round, and reactive to light.  Extraocular motions are normal.      CN V   Facial sensation intact.   Right corneal reflex: normal  Left corneal reflex: normal     CN VII   Facial expression full, symmetric.   Right facial weakness: none  Left facial weakness: none     CN VIII   Hearing: intact     CN IX, X   Palate: symmetric     CN XI   Right sternocleidomastoid strength: normal  Left sternocleidomastoid strength: normal     CN XII   Tongue: not atrophic  Tongue deviation: none     Motor Exam   Muscle bulk: normal  Right arm tone: normal  Left arm tone: normal  Right leg tone: normal  Left leg tone: normal     Strength   Strength 5/5 except as noted.      Sensory Exam   Light touch normal.      Gait, Coordination, and Reflexes      Gait  Gait: normal     Coordination   Finger to nose coordination: normal  Heel to shin coordination: normal     Reflexes   Right brachioradialis: 2+  Left brachioradialis: 2+  Right biceps: 2+  Left biceps: 2+  Right triceps: 2+  Left triceps: 2+  Right patellar: 2+  Left patellar: 2+  Right achilles: 2+  Left achilles: 2+  Right : 2+  Left : 2+           Assessment/Plan      Independent Review of Radiographic Studies:    I reviewed the recently completed myelogram on 11/28/18 which did show rather severe spinal stenosis at L2-L3 and L3-L4 with a superimposed right L2-L3 foraminal  "herniated disc.  The other postoperative levels of L4-L5 and L5-S1 looked fairly good and don't show a lot of nerve compression.  Agree with the report.        Medical Decision Making:    I described and recommended an open bilateral lumbar decompressionat L2/3 and L3/4 with a right sided L2/3 discectomy.  The goal of surgery is relief of radiating leg pain with improvement of numbness, tingling, walking, and quality of life.  This will not help or hinder low back pain.  The risks include, but are not limited to, infection, hemorrhage on transfusion or reoperation, CSF leak requiring reoperation, incomplete relief of symptoms, potential need for additional surgeries in the future including a fusion, stroke, paralysis, coma, and death.  The patient would like to think about it and will let me know if she would like to proceed.        Linda was seen today for leg pain.     Diagnoses and all orders for this visit:     Spinal stenosis, lumbar region, with neurogenic claudication     DDD (degenerative disc disease), lumbar     Herniation of lumbar intervertebral disc with radiculopathy        Return for Patient will let us know if she wants to proceed with surgery.                           Instructions         Return for Patient will let us know if she wants to proceed with surgery.    Patient declined After Visit Summary   Additional Documentation     Vitals:    /70    Pulse 68    Ht 175.3 cm (69.02\")    Wt 68.9 kg (152 lb)    BMI 22.44 kg/m²    BSA 1.84 m²    Encounter Info:    Billing Info,    History,    Allergies,    Detailed Report,    Prep for Surgery Order Report,    PAF,    Chart Review: Routing History,    Coding Summary,    Encounter Facesheet,    Link Facesheet    ...(7 more)   Communications         Letter sent to Bilyl Poole MD and Radha Cervantes MD   Media     Scan on 12/3/2018 4:36 PM by Shaina Faust: SURGERY LETTER Nyasia on 12/3/2018 4:36 PM by Shaina Faust: SURGERY LETTER " G[facility]AL    Orders Placed      None   Medication Changes        None      Medication List   Visit Diagnoses         Spinal stenosis, lumbar region, with neurogenic claudication      DDD (degenerative disc disease), lumbar      Herniation of lumbar intervertebral disc with radiculopathy      Problem List    Current Medications     CBD (cannabidiol) oral oil   Sig - Route: Take  by mouth As Needed. - Oral   Class: Historical Med   CLIMARA 0.1 MG/24HR patch   Start: 8/22/2018   Sig - Route: Place 1 patch on the skin as directed by provider 1 (One) Time Per Week. - Transdermal   Class: Historical Med   gabapentin (NEURONTIN) 300 MG capsule   Sig - Route: Take 300 mg by mouth 2 (Two) Times a Day. - Oral   Class: Historical Med   loratadine (CLARITIN) 10 MG tablet   Sig - Route: Take 10 mg by mouth Daily As Needed for Allergies. - Oral   Class: Historical Med   naproxen sodium (ALEVE) 220 MG tablet   Sig - Route: Take 220 mg by mouth 2 (Two) Times a Day As Needed. HELD - Oral   Class: Historical Med   rizatriptan (MAXALT) 10 MG tablet   Sig - Route: Take 10 mg by mouth 1 (One) Time As Needed for Migraine. May repeat in 2 hours if needed - Oral   Class: Historical Med   zolpidem (AMBIEN) 10 MG tablet   Disp: 30 tablet   Start: 3/9/2017   Sig - Route: Take 1 tablet by mouth At Night As Needed (may use prn when traveling). - Oral   Class: Print     Proceed as above. Shayne in PE.

## 2019-01-18 PROCEDURE — G0008 ADMIN INFLUENZA VIRUS VAC: HCPCS | Performed by: NEUROLOGICAL SURGERY

## 2019-01-18 PROCEDURE — 90661 CCIIV3 VAC ABX FR 0.5 ML IM: CPT | Performed by: NEUROLOGICAL SURGERY

## 2019-01-18 PROCEDURE — 97110 THERAPEUTIC EXERCISES: CPT

## 2019-01-18 PROCEDURE — 97161 PT EVAL LOW COMPLEX 20 MIN: CPT

## 2019-01-18 PROCEDURE — 25010000002 INFLUENZA VAC SUBUNIT QUAD 0.5 ML SUSPENSION PREFILLED SYRINGE: Performed by: NEUROLOGICAL SURGERY

## 2019-01-18 PROCEDURE — 99024 POSTOP FOLLOW-UP VISIT: CPT | Performed by: NURSE PRACTITIONER

## 2019-01-18 PROCEDURE — 25010000003 CEFAZOLIN IN DEXTROSE 2-4 GM/100ML-% SOLUTION: Performed by: NEUROLOGICAL SURGERY

## 2019-01-18 RX ORDER — SIMETHICONE 80 MG
80 TABLET,CHEWABLE ORAL 4 TIMES DAILY PRN
Status: DISCONTINUED | OUTPATIENT
Start: 2019-01-18 | End: 2019-01-20 | Stop reason: HOSPADM

## 2019-01-18 RX ORDER — HYDROMORPHONE HCL IN 0.9% NACL 10 MG/50ML
PATIENT CONTROLLED ANALGESIA SYRINGE INTRAVENOUS CONTINUOUS
Status: DISCONTINUED | OUTPATIENT
Start: 2019-01-18 | End: 2019-01-19

## 2019-01-18 RX ORDER — DOCUSATE SODIUM 100 MG/1
200 CAPSULE, LIQUID FILLED ORAL 2 TIMES DAILY
Status: DISCONTINUED | OUTPATIENT
Start: 2019-01-18 | End: 2019-01-20 | Stop reason: HOSPADM

## 2019-01-18 RX ADMIN — SODIUM CHLORIDE, PRESERVATIVE FREE 3 ML: 5 INJECTION INTRAVENOUS at 08:34

## 2019-01-18 RX ADMIN — GABAPENTIN 300 MG: 300 CAPSULE ORAL at 20:51

## 2019-01-18 RX ADMIN — SENNOSIDES AND DOCUSATE SODIUM 1 TABLET: 8.6; 5 TABLET ORAL at 20:51

## 2019-01-18 RX ADMIN — GABAPENTIN 300 MG: 300 CAPSULE ORAL at 08:34

## 2019-01-18 RX ADMIN — HYDROCODONE BITARTRATE AND ACETAMINOPHEN 1 TABLET: 7.5; 325 TABLET ORAL at 08:34

## 2019-01-18 RX ADMIN — HYDROCODONE BITARTRATE AND ACETAMINOPHEN 1 TABLET: 7.5; 325 TABLET ORAL at 12:46

## 2019-01-18 RX ADMIN — Medication 80 MG: at 21:57

## 2019-01-18 RX ADMIN — HYDROCODONE BITARTRATE AND ACETAMINOPHEN 1 TABLET: 7.5; 325 TABLET ORAL at 21:57

## 2019-01-18 RX ADMIN — SODIUM CHLORIDE, PRESERVATIVE FREE 3 ML: 5 INJECTION INTRAVENOUS at 20:52

## 2019-01-18 RX ADMIN — CETIRIZINE HYDROCHLORIDE 10 MG: 10 TABLET, FILM COATED ORAL at 08:34

## 2019-01-18 RX ADMIN — INFLUENZA A VIRUS A/SINGAPORE/GP1908/2015 IVR-180 (H1N1) ANTIGEN (MDCK CELL DERIVED, PROPIOLACTONE INACTIVATED), INFLUENZA A VIRUS A/NORTH CAROLINA/04/2016 (H3N2) HEMAGGLUTININ ANTIGEN (MDCK CELL DERIVED, PROPIOLACTONE INACTIVATED), INFLUENZA B VIRUS B/IOWA/06/2017 HEMAGGLUTININ ANTIGEN (MDCK CELL DERIVED, PROPIOLACTONE INACTIVATED), INFLUENZA B VIRUS B/SINGAPORE/INFTT-16-0610/2016 HEMAGGLUTININ ANTIGEN (MDCK CELL DERIVED, PROPIOLACTONE INACTIVATED) 0.5 ML: 15; 15; 15; 15 INJECTION, SUSPENSION INTRAMUSCULAR at 12:46

## 2019-01-18 RX ADMIN — CEFAZOLIN SODIUM 2 G: 2 INJECTION, SOLUTION INTRAVENOUS at 14:46

## 2019-01-18 RX ADMIN — HYDROCODONE BITARTRATE AND ACETAMINOPHEN 1 TABLET: 7.5; 325 TABLET ORAL at 17:36

## 2019-01-18 RX ADMIN — DOCUSATE SODIUM 100 MG: 100 CAPSULE, LIQUID FILLED ORAL at 08:34

## 2019-01-18 RX ADMIN — CEFAZOLIN SODIUM 2 G: 2 INJECTION, SOLUTION INTRAVENOUS at 06:30

## 2019-01-18 RX ADMIN — CYCLOBENZAPRINE 10 MG: 10 TABLET, FILM COATED ORAL at 22:45

## 2019-01-18 RX ADMIN — DOCUSATE SODIUM 200 MG: 100 CAPSULE, LIQUID FILLED ORAL at 18:41

## 2019-01-18 RX ADMIN — CYCLOBENZAPRINE 10 MG: 10 TABLET, FILM COATED ORAL at 14:46

## 2019-01-18 NOTE — PROGRESS NOTES
Post Op Visit      Feels okay except for back spasms; encouraged muscle relaxers. Walked well with PT. Hernandez out; voiding well.      98.1, 59, 16, 112/57    RUDDY drain intact - 60 cc; serosanguinous      Lumbar incision okay - dressing dry and intact.   No calf swelling or tenderness.         POD 1 Open lumbar decompression L2/3, L3/4 with right sided L2/3 discectomy for lumbar spinal stenosis, neurogenic claudication and right L2-3 herniated disc.      Keep drain  Mobilize  CBC in a.m.  DC PCA   Home when walking; voiding okay.

## 2019-01-18 NOTE — PLAN OF CARE
Problem: Patient Care Overview  Goal: Plan of Care Review   01/18/19 1007   Coping/Psychosocial   Plan of Care Reviewed With patient   OTHER   Outcome Summary pt presents w generalized weakness post op spinal sx, pt displays safe household ambulation 400ft, navigated flight of stairs, no significant gait/balance deficits. recommend DC home w assist. no further skilled PT warranted. pt to cont to mobilize w staff. will sign off.

## 2019-01-18 NOTE — THERAPY DISCHARGE NOTE
Acute Care - Physical Therapy Initial Eval/Discharge  [facility]     Patient Name: Linda Cooper  : 1955  MRN: [id number]  Today's Date: 2019   Onset of Illness/Injury or Date of Surgery: 19  Date of Referral to PT: 19  Referring Physician: Marquise      Admit Date: 2019    Visit Dx:    ICD-10-CM ICD-9-CM   1. Generalized weakness R53.1 780.79     Patient Active Problem List   Diagnosis   • Migraine with aura   • DDD (degenerative disc disease), lumbar   • Extremity pain   • LBP (low back pain)   • Spinal stenosis, lumbar region, with neurogenic claudication   • Herniation of lumbar intervertebral disc with radiculopathy   • Stenosis, spinal, lumbar     Past Medical History:   Diagnosis Date   • Allergic    • Endometriosis     Followed by Dr. Cervantes. S/p hysterectomy .   • History of endometriosis    • Low back pain    • Menopausal symptoms     Will refil estradiol.   • Migraine     Will start Frova and she will also try Excedrin migraine.   • Mitral regurgitation     Trivial mitral regurg. No antibiotics needed.   • Sciatica     Sciatica and s/p disc surgery at L5-S1. Stable now with occasional bouts of sciatica.     Past Surgical History:   Procedure Laterality Date   • HYSTERECTOMY  2007    Endometriosis. Followed by Dr. Cervantes. S/p hysterectomy .   • LUMBAR DISC SURGERY      Sciatica and s/p disc surgery at L5-S1. Stable now with occasional bouts of sciatica.          PT ASSESSMENT (last 12 hours)      Physical Therapy Evaluation     Row Name 19 1002          PT Evaluation Time/Intention    Subjective Information  no complaints  -     Document Type  discharge evaluation/summary  -     Mode of Treatment  physical therapy;individual therapy  -     Patient Effort  excellent  -     Symptoms Noted During/After Treatment  none  -     Row Name 19 1002          General Information    Patient Profile Reviewed?  yes  -     Onset of Illness/Injury or  Date of Surgery  01/17/19  -     Referring Physician  Marquise  -     Patient Observations  alert;cooperative;agree to therapy  -     General Observations of Patient  pt supine in bed no acute distress  -     Prior Level of Function  independent:  -     Equipment Currently Used at Home  none  -     Pertinent History of Current Functional Problem  POD 1 L2-4 decompression, L2-3 discectomy  -     Existing Precautions/Restrictions  fall;spinal  -     Row Name 01/18/19 1002          Cognitive Assessment/Intervention- PT/OT    Orientation Status (Cognition)  oriented x 4  -     Follows Commands (Cognition)  WFL  -     Row Name 01/18/19 1002          Bed Mobility Assessment/Treatment    Bed Mobility Assessment/Treatment  supine-sit;sit-supine  -     Supine-Sit [place] (Bed Mobility)  independent  -     Comment (Bed Mobility)  log roll  -     Row Name 01/18/19 1002          Transfer Assessment/Treatment    Transfer Assessment/Treatment  sit-stand transfer;stand-sit transfer  -     Sit-Stand [place] (Transfers)  independent  -     Stand-Sit [place] (Transfers)  independent  -     Row Name 01/18/19 1002          Gait/Stairs Assessment/Training    [place] Level (Gait)  independent  -     Distance in Feet (Gait)  400  -LH     Pattern (Gait)  swing-through  -     Deviations/Abnormal Patterns (Gait)  base of support, wide  -     [place] Level (Stairs)  supervision;verbal cues VCs sequencing   -     Handrail Location (Stairs)  left side (ascending)  -     Number of Steps (Stairs)  8  -     Ascending Technique (Stairs)  step-to-step  -     Descending Technique (Stairs)  step-to-step  -     Row Name 01/18/19 1002          General ROM    GENERAL ROM COMMENTS  BLEs functional  -     Row Name 01/18/19 1002          MMT (Manual Muscle Testing)    General MMT Comments  BLEs grossly at least 4/5  -     Row Name 01/18/19 1002          Pain Assessment     Additional Documentation  Pain Scale: FACES Pre/Post-Treatment (Group)  -     Row Name 01/18/19 1002          Pain Scale: FACES Pre/Post-Treatment    Pain: FACES Scale, Pretreatment  0-->no hurt  -     Pain: FACES Scale, Post-Treatment  0-->no hurt  -     Pre/Post Treatment Pain Comment  premedicated  -     Row Name             Wound 01/17/19 1625 Bilateral back incision    Wound - Properties Group Date first assessed: 01/17/19  - Time first assessed: 1625  -JF Side: Bilateral  -JF Location: back  -JF Type: incision  -JF    Row Name 01/18/19 1002          Plan of Care Review    Plan of Care Reviewed With  patient  -     Row Name 01/18/19 1002          Physical Therapy Clinical Impression    Date of Referral to PT  01/18/19  -     Criteria for Skilled Interventions Met (PT Clinical Impression)  current level of function same as previous level of function  -     Pathology/Pathophysiology Noted (Describe Specifically for Each System)  musculoskeletal;neuromuscular  -     Row Name 01/18/19 1002          Positioning and Restraints    Pre-Treatment Position  in bed  -     Post Treatment Position  chair  -     In Chair  reclined;call light within reach;encouraged to call for assist;exit alarm on  -       User Key  (r) = Recorded By, (t) = Taken By, (c) = Cosigned By    Initials Name Provider Type     Rupali Laguerre, PT Physical Therapist    Gopal Archuleta, RN Registered Nurse          Physical Therapy Education     Title: PT OT SLP Therapies (Resolved)     Topic: Physical Therapy (Resolved)     Point: Mobility training (Resolved)     Learning Progress Summary           Patient Acceptance, E,TB, VU,DU by  at 1/18/2019 10:07 AM                   Point: Home exercise program (Resolved)     Learning Progress Summary           Patient Acceptance, E,TB, VU,DU by  at 1/18/2019 10:07 AM                   Point: Body mechanics (Resolved)     Learning Progress Summary           Patient Acceptance, E,TB,  SUKHI NAVA by  at 1/18/2019 10:07 AM                   Point: Precautions (Resolved)     Learning Progress Summary           Patient Acceptance, E,TB, ELIJAH,SUKHI by  at 1/18/2019 10:07 AM                               User Key     Initials Effective Dates Name Provider Type [facility] 04/03/18 -  Rupali Laguerre, PT Physical Therapist PT                PT Recommendation and Plan  Anticipated Discharge Disposition (PT): home with assist  Therapy Frequency (PT Clinical Impression): evaluation only  Outcome Summary/Treatment Plan (PT)  Anticipated Discharge Disposition (PT): home with assist  Plan of Care Reviewed With: patient  Outcome Summary: pt presents w generalized weakness post op spinal sx, pt displays safe household ambulation, navigated flight of stairs, no significant gait/balance deficits. recommend DC home w assist. no further skilled PT warranted. pt to cont to mobilize w staff. will sign off.     Outcome Measures     Row Name 01/18/19 1000             How much help from another person do you currently need...    Turning from your back to your side while in flat bed without using bedrails?  4  -LH      Moving from lying on back to sitting on the side of a flat bed without bedrails?  4  -LH      Moving to and from a bed to a chair (including a wheelchair)?  4  -LH      Standing up from a chair using your arms (e.g., wheelchair, bedside chair)?  4  -LH      Climbing 3-5 steps with a railing?  4  -LH      To walk in hospital room?  4  -      AM-PAC 6 Clicks Score  24  -         Functional Assessment    Outcome Measure Options  AM-PAC 6 Clicks Basic Mobility (PT)  -        User Key  (r) = Recorded By, (t) = Taken By, (c) = Cosigned By    Initials Name Provider Type     Rupali Laguerre, PT Physical Therapist           Time Calculation:   PT Charges     Row Name 01/18/19 1010             Time Calculation    Start Time  0841  -      Stop Time  0905  -      Time Calculation (min)  24 min  -      PT  Received On  01/18/19  -        User Key  (r) = Recorded By, (t) = Taken By, (c) = Cosigned By    Initials Name Provider Type     Rupali Laguerre, PT Physical Therapist        Therapy Suggested Charges     Code   Minutes Charges    None           Therapy Charges for Today     Code Description Service Date Service Provider Modifiers Qty    39502393499 HC PT EVAL LOW COMPLEXITY 2 1/18/2019 Rupali Laguerre, PT GP 1    74173966930 HC PT THER PROC EA 15 MIN 1/18/2019 Rupali Laguerre, PT GP 1          PT G-Codes  Outcome Measure Options: AM-PAC 6 Clicks Basic Mobility (PT)  AM-PAC 6 Clicks Score: 24    PT Discharge Summary  Anticipated Discharge Disposition (PT): home with assist    Rupali Laguerre, PT  1/18/2019

## 2019-01-18 NOTE — PLAN OF CARE
Problem: Patient Care Overview  Goal: Plan of Care Review  Outcome: Ongoing (interventions implemented as appropriate)   01/17/19 0818   Coping/Psychosocial   Plan of Care Reviewed With patient   Plan of Care Review   Progress no change   OTHER   Outcome Summary using PCA and PO pain medication. requires repeated education     Goal: Individualization and Mutuality  Outcome: Ongoing (interventions implemented as appropriate)    Goal: Discharge Needs Assessment  Outcome: Ongoing (interventions implemented as appropriate)    Goal: Interprofessional Rounds/Family Conf  Outcome: Ongoing (interventions implemented as appropriate)      Problem: Fall Risk (Adult)  Goal: Identify Related Risk Factors and Signs and Symptoms  Outcome: Outcome(s) achieved Date Met: 01/17/19    Goal: Absence of Fall  Outcome: Ongoing (interventions implemented as appropriate)      Problem: Surgery Nonspecified (Adult)  Goal: Signs and Symptoms of Listed Potential Problems Will be Absent, Minimized or Managed (Surgery Nonspecified)  Outcome: Ongoing (interventions implemented as appropriate)    Goal: Anesthesia/Sedation Recovery  Outcome: Ongoing (interventions implemented as appropriate)

## 2019-01-19 LAB
DEPRECATED RDW RBC AUTO: 50.1 FL (ref 37–54)
ERYTHROCYTE [DISTWIDTH] IN BLOOD BY AUTOMATED COUNT: 14 % (ref 11.7–13)
HCT VFR BLD AUTO: 34.5 % (ref 35.6–45.5)
HGB BLD-MCNC: 11 G/DL (ref 11.9–15.5)
MCH RBC QN AUTO: 31 PG (ref 26.9–32)
MCHC RBC AUTO-ENTMCNC: 31.9 G/DL (ref 32.4–36.3)
MCV RBC AUTO: 97.2 FL (ref 80.5–98.2)
PLATELET # BLD AUTO: 226 10*3/MM3 (ref 140–500)
PMV BLD AUTO: 9.8 FL (ref 6–12)
RBC # BLD AUTO: 3.55 10*6/MM3 (ref 3.9–5.2)
WBC NRBC COR # BLD: 6.3 10*3/MM3 (ref 4.5–10.7)

## 2019-01-19 PROCEDURE — 85027 COMPLETE CBC AUTOMATED: CPT | Performed by: NURSE PRACTITIONER

## 2019-01-19 RX ORDER — ECHINACEA PURPUREA EXTRACT 125 MG
2 TABLET ORAL AS NEEDED
Status: DISCONTINUED | OUTPATIENT
Start: 2019-01-19 | End: 2019-01-20 | Stop reason: HOSPADM

## 2019-01-19 RX ADMIN — CYCLOBENZAPRINE 10 MG: 10 TABLET, FILM COATED ORAL at 08:10

## 2019-01-19 RX ADMIN — SENNOSIDES AND DOCUSATE SODIUM 1 TABLET: 8.6; 5 TABLET ORAL at 21:05

## 2019-01-19 RX ADMIN — DOCUSATE SODIUM 200 MG: 100 CAPSULE, LIQUID FILLED ORAL at 08:11

## 2019-01-19 RX ADMIN — DOCUSATE SODIUM 200 MG: 100 CAPSULE, LIQUID FILLED ORAL at 21:05

## 2019-01-19 RX ADMIN — GABAPENTIN 300 MG: 300 CAPSULE ORAL at 21:05

## 2019-01-19 RX ADMIN — CETIRIZINE HYDROCHLORIDE 10 MG: 10 TABLET, FILM COATED ORAL at 08:11

## 2019-01-19 RX ADMIN — Medication 80 MG: at 16:26

## 2019-01-19 RX ADMIN — GABAPENTIN 300 MG: 300 CAPSULE ORAL at 08:10

## 2019-01-19 RX ADMIN — SODIUM CHLORIDE, PRESERVATIVE FREE 3 ML: 5 INJECTION INTRAVENOUS at 21:05

## 2019-01-19 RX ADMIN — HYDROCODONE BITARTRATE AND ACETAMINOPHEN 1 TABLET: 7.5; 325 TABLET ORAL at 11:23

## 2019-01-19 RX ADMIN — HYDROCODONE BITARTRATE AND ACETAMINOPHEN 1 TABLET: 7.5; 325 TABLET ORAL at 05:33

## 2019-01-19 RX ADMIN — HYDROCODONE BITARTRATE AND ACETAMINOPHEN 1 TABLET: 7.5; 325 TABLET ORAL at 16:23

## 2019-01-19 RX ADMIN — SODIUM CHLORIDE, PRESERVATIVE FREE 3 ML: 5 INJECTION INTRAVENOUS at 08:11

## 2019-01-19 RX ADMIN — CYCLOBENZAPRINE 10 MG: 10 TABLET, FILM COATED ORAL at 17:33

## 2019-01-19 RX ADMIN — HYDROCODONE BITARTRATE AND ACETAMINOPHEN 1 TABLET: 7.5; 325 TABLET ORAL at 21:05

## 2019-01-19 NOTE — PLAN OF CARE
Problem: Patient Care Overview  Goal: Plan of Care Review  Outcome: Ongoing (interventions implemented as appropriate)   01/18/19 2000   Coping/Psychosocial   Plan of Care Reviewed With patient   Plan of Care Review   Progress improving   OTHER   Outcome Summary Pt AOx4, VSS, pain controlled with PRN meds. Up with standby assist. RUDDY drain in place. Dressing to back clean, dry and intact. Pt to d/c home tomorrow. Will continue to monitor.        Problem: Fall Risk (Adult)  Goal: Absence of Fall  Outcome: Ongoing (interventions implemented as appropriate)

## 2019-01-19 NOTE — PROGRESS NOTES
LOS: 0 days   Patient Care Team:  Billy Poole MD as PCP - General  Radha Cervantes MD as Consulting Physician (Obstetrics and Gynecology)    Chief Complaint:  Postop lumbar discectomy    Subjective     The patient is actually feeling pretty good.  She does have some pain in her back obviously but not a lot of pain in her legs.  She is walking to the bathroom and relatively stable on her walking.    Interval History:     History taken from: patient chart    Objective      She has good movement in both lower extremities and her incision looks okay.    Vital Signs  Temp:  [97.9 °F (36.6 °C)-98.7 °F (37.1 °C)] 98.7 °F (37.1 °C)  Heart Rate:  [59-81] 81  Resp:  [16] 16  BP: ()/(47-67) 103/54       Results Review:     I reviewed the patient's new clinical results.  She had approximately 60 cc out of her drain in the last 24 hours.  She is afebrile with a white count of 6.3.      Assessment/Plan       DDD (degenerative disc disease), lumbar    Spinal stenosis, lumbar region, with neurogenic claudication    Herniation of lumbar intervertebral disc with radiculopathy    Stenosis, spinal, lumbar      I told the patient I thought we should watch her 24 hours more he cause of the drainage.  We will stop her that a lot of PCA and switch her to oral medications as well.      Darwin Mancilla MD  01/19/19  12:38 PM

## 2019-01-19 NOTE — PLAN OF CARE
Problem: Patient Care Overview  Goal: Plan of Care Review  Outcome: Ongoing (interventions implemented as appropriate)   01/19/19 0542   Coping/Psychosocial   Plan of Care Reviewed With patient   Plan of Care Review   Progress improving   OTHER   Outcome Summary AVSS. AXOX4. Pain well controlled. RUDDY still in place. Will continue to monitor.        Problem: Fall Risk (Adult)  Goal: Absence of Fall  Outcome: Ongoing (interventions implemented as appropriate)   01/19/19 0542   Fall Risk (Adult)   Absence of Fall making progress toward outcome

## 2019-01-20 VITALS
BODY MASS INDEX: 23.6 KG/M2 | RESPIRATION RATE: 18 BRPM | WEIGHT: 159.38 LBS | DIASTOLIC BLOOD PRESSURE: 65 MMHG | SYSTOLIC BLOOD PRESSURE: 109 MMHG | TEMPERATURE: 98 F | HEIGHT: 69 IN | HEART RATE: 64 BPM | OXYGEN SATURATION: 96 %

## 2019-01-20 LAB
BASOPHILS # BLD AUTO: 0.02 10*3/MM3 (ref 0–0.2)
BASOPHILS NFR BLD AUTO: 0.3 % (ref 0–1.5)
DEPRECATED RDW RBC AUTO: 46.6 FL (ref 37–54)
EOSINOPHIL # BLD AUTO: 0.09 10*3/MM3 (ref 0–0.7)
EOSINOPHIL NFR BLD AUTO: 1.4 % (ref 0.3–6.2)
ERYTHROCYTE [DISTWIDTH] IN BLOOD BY AUTOMATED COUNT: 13.4 % (ref 11.7–13)
HCT VFR BLD AUTO: 33.4 % (ref 35.6–45.5)
HGB BLD-MCNC: 10.7 G/DL (ref 11.9–15.5)
IMM GRANULOCYTES # BLD AUTO: 0.02 10*3/MM3 (ref 0–0.03)
IMM GRANULOCYTES NFR BLD AUTO: 0.3 % (ref 0–0.5)
LYMPHOCYTES # BLD AUTO: 1.5 10*3/MM3 (ref 0.9–4.8)
LYMPHOCYTES NFR BLD AUTO: 22.7 % (ref 19.6–45.3)
MCH RBC QN AUTO: 30.6 PG (ref 26.9–32)
MCHC RBC AUTO-ENTMCNC: 32 G/DL (ref 32.4–36.3)
MCV RBC AUTO: 95.4 FL (ref 80.5–98.2)
MONOCYTES # BLD AUTO: 0.68 10*3/MM3 (ref 0.2–1.2)
MONOCYTES NFR BLD AUTO: 10.3 % (ref 5–12)
NEUTROPHILS # BLD AUTO: 4.31 10*3/MM3 (ref 1.9–8.1)
NEUTROPHILS NFR BLD AUTO: 65 % (ref 42.7–76)
PLATELET # BLD AUTO: 219 10*3/MM3 (ref 140–500)
PMV BLD AUTO: 9.4 FL (ref 6–12)
RBC # BLD AUTO: 3.5 10*6/MM3 (ref 3.9–5.2)
WBC NRBC COR # BLD: 6.62 10*3/MM3 (ref 4.5–10.7)

## 2019-01-20 PROCEDURE — 85025 COMPLETE CBC W/AUTO DIFF WBC: CPT | Performed by: NEUROLOGICAL SURGERY

## 2019-01-20 RX ORDER — CYCLOBENZAPRINE HCL 10 MG
10 TABLET ORAL 3 TIMES DAILY PRN
Qty: 30 TABLET | Refills: 0 | Status: SHIPPED | OUTPATIENT
Start: 2019-01-20 | End: 2019-03-05 | Stop reason: HOSPADM

## 2019-01-20 RX ORDER — SENNA AND DOCUSATE SODIUM 50; 8.6 MG/1; MG/1
2 TABLET, FILM COATED ORAL NIGHTLY PRN
Qty: 20 TABLET | Refills: 0 | Status: SHIPPED | OUTPATIENT
Start: 2019-01-20 | End: 2019-10-23

## 2019-01-20 RX ORDER — HYDROCODONE BITARTRATE AND ACETAMINOPHEN 7.5; 325 MG/1; MG/1
1 TABLET ORAL EVERY 6 HOURS PRN
Qty: 40 TABLET | Refills: 0 | Status: SHIPPED | OUTPATIENT
Start: 2019-01-20 | End: 2019-01-27

## 2019-01-20 RX ORDER — PSEUDOEPHEDRINE HCL 30 MG
200 TABLET ORAL 2 TIMES DAILY
Qty: 20 EACH | Refills: 0 | Status: SHIPPED | OUTPATIENT
Start: 2019-01-20 | End: 2019-10-23

## 2019-01-20 RX ADMIN — ACETAMINOPHEN 650 MG: 325 TABLET, FILM COATED ORAL at 11:02

## 2019-01-20 RX ADMIN — SODIUM CHLORIDE, PRESERVATIVE FREE 3 ML: 5 INJECTION INTRAVENOUS at 09:00

## 2019-01-20 RX ADMIN — HYDROCODONE BITARTRATE AND ACETAMINOPHEN 1 TABLET: 7.5; 325 TABLET ORAL at 04:31

## 2019-01-20 RX ADMIN — CETIRIZINE HYDROCHLORIDE 10 MG: 10 TABLET, FILM COATED ORAL at 07:29

## 2019-01-20 RX ADMIN — GABAPENTIN 300 MG: 300 CAPSULE ORAL at 09:19

## 2019-01-20 RX ADMIN — DOCUSATE SODIUM 200 MG: 100 CAPSULE, LIQUID FILLED ORAL at 09:19

## 2019-01-20 RX ADMIN — HYDROCODONE BITARTRATE AND ACETAMINOPHEN 1 TABLET: 7.5; 325 TABLET ORAL at 11:51

## 2019-01-20 NOTE — PLAN OF CARE
Problem: Patient Care Overview  Goal: Plan of Care Review  Outcome: Ongoing (interventions implemented as appropriate)   01/19/19 1900   Coping/Psychosocial   Plan of Care Reviewed With patient   Plan of Care Review   Progress improving   OTHER   Outcome Summary Doing well. Ambulating in mantilla with . Pain controlled with oral medications. Will continue to monitor. Possible d/c in AM.        Problem: Fall Risk (Adult)  Goal: Absence of Fall  Outcome: Ongoing (interventions implemented as appropriate)      Problem: Surgery Nonspecified (Adult)  Goal: Signs and Symptoms of Listed Potential Problems Will be Absent, Minimized or Managed (Surgery Nonspecified)  Outcome: Ongoing (interventions implemented as appropriate)

## 2019-01-20 NOTE — PLAN OF CARE
Problem: Patient Care Overview  Goal: Plan of Care Review  Outcome: Ongoing (interventions implemented as appropriate)   01/20/19 0601   Coping/Psychosocial   Plan of Care Reviewed With patient   Plan of Care Review   Progress improving   OTHER   Outcome Summary AVSS. AXOX4. Up ad ervin. Pain well controlled. Will continue to monitor.        Problem: Fall Risk (Adult)  Goal: Absence of Fall  Outcome: Ongoing (interventions implemented as appropriate)   01/20/19 0601   Fall Risk (Adult)   Absence of Fall making progress toward outcome

## 2019-01-20 NOTE — DISCHARGE SUMMARY
This patient was admitted about 3 days ago for a decompression from L2 to L4.  Postoperatively she has had good relief of the pain in her legs was still has a lot of back pain.  She has some constipation as well.  Her incision is clean and dry and her drainage has decreased to less than 50 cc over 24 hours.  Her drain will be removed prior to discharge and we have given her some medications for the constipation.  She is to call the office for signs of infection or other problems and otherwise will be seen as scheduled.  She is afebrile with a normal white count.

## 2019-01-21 ENCOUNTER — TELEPHONE (OUTPATIENT)
Dept: NEUROSURGERY | Facility: CLINIC | Age: 64
End: 2019-01-21

## 2019-01-21 NOTE — TELEPHONE ENCOUNTER
How are you feeling after your surgery?  She called earlier to get some direction about bathing.  Carey answered all of her questions about bathing.      Are you having any pain?  Mostly incisional   Her pre op symptoms have reduced greatly.  She still has some numbness in her heel.  I explained that the numbness will take some time to go away and that it is normal to have some of the same symptoms post op that she had pre op.    Do you feel better than before surgery?  It is hard to tell, she is still groggy and sore.    Are you taking the prescribed pain medicine?  Yes, trying to not take as much as prescribed.  She is supplementing with Tylenol and it is helping.     Do you feel like it's helping?  Yes     Surgical dressing/dermabond?  Dressings still in place.    How does your incision look?  Surgery site-red, swollen, drainage?  She hasn't looked at her incision yet, but agreed to call in if there are any signs of infection present.     Any other problems? (ie:  Nausea or constipation)  Some constipation but resolved this morning.  I explained to her that Dr. Camarillo has no restrictions on stool softeners, laxatives or probiotics.    Questions regarding Post-op/ discharge instructions?  No     Any other questions?  She asked what the flexeril was for.  I explained that it will help with tight, bunched up muscles.    She asked if it would constipate her and I told her it would not.     Confirm post op appointment  Yes

## 2019-01-21 NOTE — H&P
Office Visit     12/3/2018  [facility] NEUROSURGERY   Edson Camarillo MD   Neurosurgery   Spinal stenosis, lumbar region, with neurogenic claudication +2 more   Dx   Leg Pain     Reason for Visit    Progress Notes     Expand All Collapse All       Subjective      Patient ID: Linda Cooper is a 63 y.o. female is here today for follow-up to discuss lumbar myelogram and plain films.  Patient was last seen 10.10.18 for low back pain and right leg pain.  Patient tolerated the myelogram well, she did not get a post myelogram headache. Patient states that her right hip and right anterior thigh pain worse.  She has N/T right foot that is constant and right groin pain worse at night.  Patient states that her low back and right leg feel weak.  She has had ADY in the past that helped some. She is taking Gabapentin 300 mg hs     Leg Pain    The pain is present in the right hip and right thigh. Associated symptoms include numbness.         The following portions of the patient's history were reviewed and updated as appropriate: allergies, current medications, past family history, past medical history, past social history, past surgical history and problem list.     Review of Systems   Musculoskeletal: Positive for arthralgias and back pain.   Neurological: Positive for numbness.   All other systems reviewed and are negative.     The patient is with her . I did an open right L4-L5 microdiskectomy 6 years ago for herniated disk. She did quite well. She had previous surgery at L5-S1 in the 1990s. She did well from that. About a year ago she began having recurrent symptoms in the right leg that were different compared to before. They involved the right groin and right anterior thigh. She has some subjective weakness in her right quadriceps manifested when she climbs stairs she actually has very little low back pain. She had a little bit of numbness in the left leg but nothing terribly painful. This has  been getting progressively worse. She has spinal stenosis at L2-L3 and L3-L4 with superimposed right L2-L3 foraminal herniated disk. Blocks really did not help all that much in the long run and she is already on gabapentin. She has a positive shopping cart sign and the history is consistent more with neurogenic claudication with more symptoms being present with standing and walking. Lying down and sitting are actually generally okay. We had a long discussion about this. She is at a point where she could choose surgical intervention consisting of an open L2-L3 and L3-L4 decompression bilaterally with a right-sided L2-L3 diskectomy. She does not need to be fused. She would like to thin about it but I suspect she will move forward of this in her own time frame. She is generally healthy and does exercise regularly which I encouraged her to get back to. 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 If she decides to move forward will have her follow up with Estrella, who saw her before this visit.            Objective      Physical Exam   Constitutional: She is oriented to person, place, and time. She appears well-developed and well-nourished.   HENT:   Head: Normocephalic and atraumatic.   Eyes: Conjunctivae and EOM are normal. Pupils are equal, round, and reactive to light.   Fundoscopic exam:       The right eye shows no papilledema. The right eye shows venous pulsations.        The left eye shows no papilledema. The left eye shows venous pulsations.   Neck: Carotid bruit is not present.   Neurological: She is oriented to person, place, and time. She has a normal Finger-Nose-Finger Test and a normal Heel to Shin Test. Gait normal.   Reflex Scores:       Tricep reflexes are 2+ on the right side and 2+ on the left side.       Bicep reflexes are 2+ on the right side and 2+ on the left side.       Brachioradialis reflexes are 2+ on the right side and 2+ on the  left side.       Patellar reflexes are 2+ on the right side and 2+ on the left side.       Achilles reflexes are 2+ on the right side and 2+ on the left side.  Psychiatric: Her speech is normal.      Neurologic Exam      Mental Status   Oriented to person, place, and time.   Registration of memory: Good recent and remote memory.   Attention: normal. Concentration: normal.   Speech: speech is normal   Level of consciousness: alert  Knowledge: consistent with education.      Cranial Nerves      CN II   Visual fields full to confrontation.   Visual acuity: normal     CN III, IV, VI   Pupils are equal, round, and reactive to light.  Extraocular motions are normal.      CN V   Facial sensation intact.   Right corneal reflex: normal  Left corneal reflex: normal     CN VII   Facial expression full, symmetric.   Right facial weakness: none  Left facial weakness: none     CN VIII   Hearing: intact     CN IX, X   Palate: symmetric     CN XI   Right sternocleidomastoid strength: normal  Left sternocleidomastoid strength: normal     CN XII   Tongue: not atrophic  Tongue deviation: none     Motor Exam   Muscle bulk: normal  Right arm tone: normal  Left arm tone: normal  Right leg tone: normal  Left leg tone: normal     Strength   Strength 5/5 except as noted.      Sensory Exam   Light touch normal.      Gait, Coordination, and Reflexes      Gait  Gait: normal     Coordination   Finger to nose coordination: normal  Heel to shin coordination: normal     Reflexes   Right brachioradialis: 2+  Left brachioradialis: 2+  Right biceps: 2+  Left biceps: 2+  Right triceps: 2+  Left triceps: 2+  Right patellar: 2+  Left patellar: 2+  Right achilles: 2+  Left achilles: 2+  Right : 2+  Left : 2+           Assessment/Plan      Independent Review of Radiographic Studies:    I reviewed the recently completed myelogram on 11/28/18 which did show rather severe spinal stenosis at L2-L3 and L3-L4 with a superimposed right L2-L3 foraminal  "herniated disc.  The other postoperative levels of L4-L5 and L5-S1 looked fairly good and don't show a lot of nerve compression.  Agree with the report.        Medical Decision Making:    I described and recommended an open bilateral lumbar decompressionat L2/3 and L3/4 with a right sided L2/3 discectomy.  The goal of surgery is relief of radiating leg pain with improvement of numbness, tingling, walking, and quality of life.  This will not help or hinder low back pain.  The risks include, but are not limited to, infection, hemorrhage on transfusion or reoperation, CSF leak requiring reoperation, incomplete relief of symptoms, potential need for additional surgeries in the future including a fusion, stroke, paralysis, coma, and death.  The patient would like to think about it and will let me know if she would like to proceed.        Linda was seen today for leg pain.     Diagnoses and all orders for this visit:     Spinal stenosis, lumbar region, with neurogenic claudication     DDD (degenerative disc disease), lumbar     Herniation of lumbar intervertebral disc with radiculopathy        Return for Patient will let us know if she wants to proceed with surgery.                           Instructions         Return for Patient will let us know if she wants to proceed with surgery.    Patient declined After Visit Summary   Additional Documentation     Vitals:    /70    Pulse 68    Ht 175.3 cm (69.02\")    Wt 68.9 kg (152 lb)    BMI 22.44 kg/m²    BSA 1.84 m²    Encounter Info:    Billing Info,    History,    Allergies,    Detailed Report,    Prep for Surgery Order Report,    PAF,    Chart Review: Routing History,    Coding Summary,    Encounter Facesheet,    Link Facesheet    ...(6 more)   Communications         Letter sent to Billy Poole MD and Radha Cervantes MD   Media     Scan on 12/3/2018 4:36 PM by Shaina Faust: SURGERY LETTER Nyasia on 12/3/2018 4:36 PM by Shaina Faust: SURGERY LETTER " GHAZAL    Orders Placed      None   Medication Changes        None      Medication List   Visit Diagnoses         Spinal stenosis, lumbar region, with neurogenic claudication      DDD (degenerative disc disease), lumbar      Herniation of lumbar intervertebral disc with radiculopathy      Problem List    Current Medications     CBD (cannabidiol) oral oil   Sig - Route: Take  by mouth As Needed. - Oral   Class: Historical Med   CLIMARA 0.1 MG/24HR patch   Start: 8/22/2018   Sig - Route: Place 1 patch on the skin as directed by provider 1 (One) Time Per Week. - Transdermal   Class: Historical Med   cyclobenzaprine (FLEXERIL) 10 MG tablet   Disp: 30 tablet   Start: 1/20/2019   Sig - Route: Take 1 tablet by mouth 3 (Three) Times a Day As Needed for Muscle Spasms. - Oral   docusate sodium 100 MG capsule   Disp: 20 each   Start: 1/20/2019   Sig - Route: Take 200 mg by mouth 2 (Two) Times a Day. - Oral   gabapentin (NEURONTIN) 300 MG capsule   Sig - Route: Take 300 mg by mouth 2 (Two) Times a Day. - Oral   Class: Historical Med   HYDROcodone-acetaminophen (NORCO) 7.5-325 MG per tablet   Disp: 40 tablet   Start: 1/20/2019   End: 1/27/2019   Sig - Route: Take 1 tablet by mouth Every 6 (Six) Hours As Needed for Moderate Pain  for up to 7 days. - Oral   Earliest Fill Date: 1/20/2019   loratadine (CLARITIN) 10 MG tablet   Sig - Route: Take 10 mg by mouth Daily As Needed for Allergies. - Oral   Class: Historical Med   naproxen sodium (ALEVE) 220 MG tablet   Sig - Route: Take 220 mg by mouth 2 (Two) Times a Day As Needed. HELD - Oral   Class: Historical Med   rizatriptan (MAXALT) 10 MG tablet   Sig - Route: Take 10 mg by mouth 1 (One) Time As Needed for Migraine. May repeat in 2 hours if needed - Oral   Class: Historical Med   sennosides-docusate sodium (SENOKOT-S) 8.6-50 MG tablet   Disp: 20 tablet   Start: 1/20/2019   Sig - Route: Take 2 tablets by mouth At Night As Needed for Constipation. - Oral   zolpidem (AMBIEN) 10 MG  tablet   Disp: 30 tablet   Start: 3/9/2017   Sig - Route: Take 1 tablet by mouth At Night As Needed (may use prn when traveling). - Oral   Class: Print     Date of H&P: 1/17/19, updated from office note.  No change in PE> proc eed as above.

## 2019-01-21 NOTE — TELEPHONE ENCOUNTER
Pt called this am to ask when she could shower. She was released from the hospital yesterday. She states that she asked Dr. Mancilla about showering and he told her to call the office today and speak with Gayle. Gayle was in a room so I spoke with Rupali. Rupali says that she told pt on 1/18/19 that she could shower at that time. Pt also mentioned waterproof dressing. As per Rupali's instructions I told pt to remove dressing but leave on steri-strips it they are present and they will come off on their own. I told her not to let water hit directly on her incision but allow the water to flow down her back and this will rinse the wound gently.

## 2019-02-01 ENCOUNTER — OFFICE VISIT (OUTPATIENT)
Dept: NEUROSURGERY | Facility: CLINIC | Age: 64
End: 2019-02-01

## 2019-02-01 VITALS
BODY MASS INDEX: 23.55 KG/M2 | HEIGHT: 69 IN | SYSTOLIC BLOOD PRESSURE: 102 MMHG | DIASTOLIC BLOOD PRESSURE: 98 MMHG | HEART RATE: 87 BPM | WEIGHT: 159 LBS

## 2019-02-01 DIAGNOSIS — Z09 SURGERY FOLLOW-UP EXAMINATION: Primary | ICD-10-CM

## 2019-02-01 PROBLEM — M48.061 STENOSIS, SPINAL, LUMBAR: Status: RESOLVED | Noted: 2019-01-17 | Resolved: 2019-02-01

## 2019-02-01 PROBLEM — M48.062 SPINAL STENOSIS, LUMBAR REGION, WITH NEUROGENIC CLAUDICATION: Status: RESOLVED | Noted: 2018-08-29 | Resolved: 2019-02-01

## 2019-02-01 PROBLEM — M51.16 HERNIATION OF LUMBAR INTERVERTEBRAL DISC WITH RADICULOPATHY: Status: RESOLVED | Noted: 2018-12-03 | Resolved: 2019-02-01

## 2019-02-01 PROCEDURE — 99024 POSTOP FOLLOW-UP VISIT: CPT | Performed by: PHYSICIAN ASSISTANT

## 2019-03-05 ENCOUNTER — OFFICE VISIT (OUTPATIENT)
Dept: NEUROSURGERY | Facility: CLINIC | Age: 64
End: 2019-03-05

## 2019-03-05 VITALS
HEIGHT: 69 IN | DIASTOLIC BLOOD PRESSURE: 69 MMHG | SYSTOLIC BLOOD PRESSURE: 107 MMHG | BODY MASS INDEX: 23.55 KG/M2 | WEIGHT: 159 LBS | HEART RATE: 70 BPM

## 2019-03-05 DIAGNOSIS — Z09 SURGERY FOLLOW-UP EXAMINATION: Primary | ICD-10-CM

## 2019-03-05 PROBLEM — M79.609 EXTREMITY PAIN: Status: RESOLVED | Noted: 2017-03-09 | Resolved: 2019-03-05

## 2019-03-05 PROCEDURE — 99024 POSTOP FOLLOW-UP VISIT: CPT | Performed by: PHYSICIAN ASSISTANT

## 2019-03-11 DIAGNOSIS — F51.01 PRIMARY INSOMNIA: ICD-10-CM

## 2019-03-13 DIAGNOSIS — F51.01 PRIMARY INSOMNIA: ICD-10-CM

## 2019-03-13 RX ORDER — ZOLPIDEM TARTRATE 10 MG/1
10 TABLET ORAL NIGHTLY PRN
Qty: 30 TABLET | Refills: 0 | Status: SHIPPED | OUTPATIENT
Start: 2019-03-13 | End: 2019-03-13 | Stop reason: SDUPTHER

## 2019-03-13 RX ORDER — ZOLPIDEM TARTRATE 10 MG/1
10 TABLET ORAL NIGHTLY PRN
Qty: 30 TABLET | Refills: 0 | Status: SHIPPED | OUTPATIENT
Start: 2019-03-13 | End: 2020-01-24

## 2019-04-03 NOTE — PROGRESS NOTES
Subjective   Patient ID: Linda Cooper is a 63 y.o. female is here today for follow-up. She is almost 3 months out from a L2-4 open bilateral lumbar decompression by Dr. Camarillo 1/17/19. She has been going to PT which has helped.  Leg pain is better. She takes Gabapentin 600 mg HS for pain.     Back Pain   The pain is at a severity of 3/10. The pain is mild. Associated symptoms include weakness (lower back). Pertinent negatives include no headaches.       The following portions of the patient's history were reviewed and updated as appropriate: allergies, current medications, past family history, past medical history, past social history, past surgical history and problem list.    Review of Systems   Genitourinary: Negative for difficulty urinating.   Musculoskeletal: Positive for back pain. Negative for joint swelling and neck pain.   Neurological: Positive for weakness (lower back). Negative for dizziness and headaches.   All other systems reviewed and are negative.      Objective   Physical Exam   Pulmonary/Chest: Effort normal.   Neurological: She is alert.   Skin: Skin is warm and dry.     Neurologic Exam    Assessment/Plan   Independent Review of Radiographic Studies:      Medical Decision Making:    Ms. Cooper is almost 3 months out from an L2-L4 open bilateral lumbar decompression with right L2-L3 discectomy.  She has done extremely well and no longer has any significant radicular pain.  Her back pain is improving but still bothersome after any prolonged or repetitive activity.  I reassured her that this is not unexpected.  She is going to therapy and working on core strengthening.  She can continue to increase her activity as tolerated.  She is aware of the risk of recurrence/adjacent level disease and will call as needed.  Linda was seen today for back pain.    Diagnoses and all orders for this visit:    Surgery follow-up examination      Return if symptoms worsen or fail to improve.

## 2019-04-16 ENCOUNTER — OFFICE VISIT (OUTPATIENT)
Dept: NEUROSURGERY | Facility: CLINIC | Age: 64
End: 2019-04-16

## 2019-04-16 VITALS
BODY MASS INDEX: 23.55 KG/M2 | DIASTOLIC BLOOD PRESSURE: 62 MMHG | HEART RATE: 60 BPM | SYSTOLIC BLOOD PRESSURE: 100 MMHG | WEIGHT: 159 LBS | HEIGHT: 69 IN

## 2019-04-16 DIAGNOSIS — Z09 SURGERY FOLLOW-UP EXAMINATION: Primary | ICD-10-CM

## 2019-04-16 PROCEDURE — 99024 POSTOP FOLLOW-UP VISIT: CPT | Performed by: PHYSICIAN ASSISTANT

## 2019-06-05 RX ORDER — GABAPENTIN 300 MG/1
300 CAPSULE ORAL 2 TIMES DAILY
OUTPATIENT
Start: 2019-06-05

## 2019-06-05 RX ORDER — RIZATRIPTAN BENZOATE 10 MG/1
10 TABLET ORAL ONCE AS NEEDED
Qty: 9 TABLET | Refills: 2 | Status: SHIPPED | OUTPATIENT
Start: 2019-06-05 | End: 2019-10-23 | Stop reason: SDUPTHER

## 2019-06-05 NOTE — TELEPHONE ENCOUNTER
Patient called requesting a refill of Gabapentin be sent to [facility] [phone] she is on Vacation please call patient at [phone] when done.

## 2019-06-06 RX ORDER — GABAPENTIN 300 MG/1
300 CAPSULE ORAL 2 TIMES DAILY
Qty: 60 CAPSULE | Refills: 2 | Status: SHIPPED | OUTPATIENT
Start: 2019-06-06 | End: 2019-10-23 | Stop reason: SDUPTHER

## 2019-06-06 NOTE — TELEPHONE ENCOUNTER
"Spoke with patient, she is not having leg pain per se, but does have numbness is her feet intermittently and \" twitching at night\" in her legs.    She is just taking it at bedtime, sometimes she takes one and other times she takes 2    Please advise  "

## 2019-07-05 ENCOUNTER — TELEPHONE (OUTPATIENT)
Dept: FAMILY MEDICINE CLINIC | Facility: CLINIC | Age: 64
End: 2019-07-05

## 2019-07-05 RX ORDER — LORATADINE 10 MG/1
10 TABLET ORAL DAILY PRN
Qty: 90 TABLET | Refills: 0 | Status: SHIPPED | OUTPATIENT
Start: 2019-07-05

## 2019-07-09 RX ORDER — GABAPENTIN 300 MG/1
300 CAPSULE ORAL 2 TIMES DAILY
Qty: 60 CAPSULE | Refills: 2 | OUTPATIENT
Start: 2019-07-09

## 2019-08-23 RX ORDER — RIZATRIPTAN BENZOATE 10 MG/1
10 TABLET ORAL ONCE AS NEEDED
Qty: 9 TABLET | Refills: 2 | Status: CANCELLED | OUTPATIENT
Start: 2019-08-23

## 2019-08-23 RX ORDER — RIZATRIPTAN BENZOATE 10 MG/1
10 TABLET ORAL ONCE AS NEEDED
Qty: 9 TABLET | Refills: 0 | Status: SHIPPED | OUTPATIENT
Start: 2019-08-23 | End: 2019-10-23 | Stop reason: SDUPTHER

## 2019-08-26 ENCOUNTER — CLINICAL SUPPORT (OUTPATIENT)
Dept: FAMILY MEDICINE CLINIC | Facility: CLINIC | Age: 64
End: 2019-08-26

## 2019-08-26 DIAGNOSIS — Z23 NEED FOR VACCINATION: Primary | ICD-10-CM

## 2019-08-26 PROCEDURE — 90750 HZV VACC RECOMBINANT IM: CPT | Performed by: FAMILY MEDICINE

## 2019-08-26 PROCEDURE — 90471 IMMUNIZATION ADMIN: CPT | Performed by: FAMILY MEDICINE

## 2019-09-11 DIAGNOSIS — M79.661 PAIN IN BOTH LOWER LEGS: ICD-10-CM

## 2019-09-11 DIAGNOSIS — M79.662 PAIN IN BOTH LOWER LEGS: ICD-10-CM

## 2019-09-13 RX ORDER — GABAPENTIN 300 MG/1
CAPSULE ORAL
Qty: 180 CAPSULE | OUTPATIENT
Start: 2019-09-13

## 2019-10-07 ENCOUNTER — TELEPHONE (OUTPATIENT)
Dept: NEUROSURGERY | Facility: CLINIC | Age: 64
End: 2019-10-07

## 2019-10-07 NOTE — TELEPHONE ENCOUNTER
Rec'd refill request for Gabapentin, LM for patient letting her know that she needs to get this from her PCP per Dr DUEÑAS    She was also informed of this on 7.9.19.  Patient was last seen 4.16.19 and was prn'd at that time

## 2019-10-23 ENCOUNTER — OFFICE VISIT (OUTPATIENT)
Dept: FAMILY MEDICINE CLINIC | Facility: CLINIC | Age: 64
End: 2019-10-23

## 2019-10-23 VITALS
WEIGHT: 156 LBS | BODY MASS INDEX: 23.11 KG/M2 | OXYGEN SATURATION: 99 % | DIASTOLIC BLOOD PRESSURE: 64 MMHG | HEIGHT: 69 IN | HEART RATE: 60 BPM | RESPIRATION RATE: 16 BRPM | SYSTOLIC BLOOD PRESSURE: 94 MMHG

## 2019-10-23 DIAGNOSIS — G43.109 MIGRAINE WITH AURA AND WITHOUT STATUS MIGRAINOSUS, NOT INTRACTABLE: ICD-10-CM

## 2019-10-23 DIAGNOSIS — M51.36 DDD (DEGENERATIVE DISC DISEASE), LUMBAR: Primary | ICD-10-CM

## 2019-10-23 DIAGNOSIS — M54.31 SCIATICA OF RIGHT SIDE: ICD-10-CM

## 2019-10-23 PROCEDURE — 99214 OFFICE O/P EST MOD 30 MIN: CPT | Performed by: FAMILY MEDICINE

## 2019-10-23 RX ORDER — GABAPENTIN 300 MG/1
300 CAPSULE ORAL 2 TIMES DAILY
Qty: 60 CAPSULE | Refills: 4 | Status: SHIPPED | OUTPATIENT
Start: 2019-10-23 | End: 2020-05-19

## 2019-10-23 RX ORDER — RIZATRIPTAN BENZOATE 10 MG/1
10 TABLET ORAL ONCE AS NEEDED
Qty: 9 TABLET | Refills: 2 | Status: SHIPPED | OUTPATIENT
Start: 2019-10-23 | End: 2020-03-09

## 2019-10-23 NOTE — PROGRESS NOTES
Subjective   Linda Cooper is a 64 y.o. female.     History of Present Illness   Linda is here to discuss her medication for migraine and rx refills on other medications. She states she has head pain 4 days a week, migraine and sinus combined.  She has chronic back pain with right side sciatica and has been taking gabapentin with good relief, She is swimming and walking as well., She is requesting a refill.   She has noticed an increase in migraine headaches. She is not sure what is setting them off and is having at least one a week and often twice a week. She has been taking Maxalt and is getting some relief.  The following portions of the patient's history were reviewed and updated as appropriate: allergies, current medications, past family history, past medical history, past social history, past surgical history and problem list.    Review of Systems   HENT: Positive for congestion.    Eyes: Negative.    Respiratory: Negative.    Cardiovascular: Negative.    Gastrointestinal: Negative.    Genitourinary: Negative.    Musculoskeletal: Positive for back pain.   Skin: Negative.    Neurological: Positive for headache.   Psychiatric/Behavioral: Negative.        Objective   Physical Exam   Constitutional: She is oriented to person, place, and time. She appears well-developed and well-nourished.   HENT:   Head: Normocephalic and atraumatic.   Eyes: EOM are normal. Pupils are equal, round, and reactive to light.   Neck: Normal range of motion.   Cardiovascular: Normal rate and regular rhythm.   Pulmonary/Chest: Effort normal.   Neurological: She is alert and oriented to person, place, and time.   Skin: Skin is warm and dry. No rash noted.   Psychiatric: She has a normal mood and affect. Her behavior is normal. Judgment and thought content normal.   Nursing note and vitals reviewed.        Assessment/Plan   Linda was seen today for migraine and med refill.    Diagnoses and all orders for this visit:    DDD (degenerative  disc disease), lumbar  -     gabapentin (NEURONTIN) 300 MG capsule; Take 1 capsule by mouth 2 (Two) Times a Day.  The patient has read and signed the [facility] Controlled Substance Contract.  I will continue to see patient for regular follow up appointments.  She are well controlled on current medication.  KHOI has been reviewed by me and is updated every 3 months. The patient is aware of the potential for addiction and dependence.        Sciatica of right side  -     gabapentin (NEURONTIN) 300 MG capsule; Take 1 capsule by mouth 2 (Two) Times a Day.    Migraine with aura and without status migrainosus, not intractable  -     rizatriptan (MAXALT) 10 MG tablet; Take 1 tablet by mouth 1 (One) Time As Needed for Migraine for up to 1 dose. May repeat in 2 hours if needed

## 2020-01-21 DIAGNOSIS — Z13.220 SCREENING FOR LIPOID DISORDERS: ICD-10-CM

## 2020-01-21 DIAGNOSIS — Z00.00 ROUTINE GENERAL MEDICAL EXAMINATION AT A HEALTH CARE FACILITY: Primary | ICD-10-CM

## 2020-01-22 LAB
ERYTHROCYTE [DISTWIDTH] IN BLOOD BY AUTOMATED COUNT: 12.8 % (ref 12.3–15.4)
HCT VFR BLD AUTO: 39.5 % (ref 34–46.6)
HGB BLD-MCNC: 13.1 G/DL (ref 12–15.9)
MCH RBC QN AUTO: 30.8 PG (ref 26.6–33)
MCHC RBC AUTO-ENTMCNC: 33.2 G/DL (ref 31.5–35.7)
MCV RBC AUTO: 92.7 FL (ref 79–97)
PLATELET # BLD AUTO: 253 10*3/MM3 (ref 140–450)
RBC # BLD AUTO: 4.26 10*6/MM3 (ref 3.77–5.28)
WBC # BLD AUTO: 7.16 10*3/MM3 (ref 3.4–10.8)

## 2020-01-23 LAB
ALBUMIN SERPL-MCNC: 4.2 G/DL (ref 3.5–5.2)
ALBUMIN/GLOB SERPL: 1.7 G/DL
ALP SERPL-CCNC: 65 U/L (ref 39–117)
ALT SERPL-CCNC: 14 U/L (ref 1–33)
AST SERPL-CCNC: 23 U/L (ref 1–32)
BILIRUB SERPL-MCNC: 0.4 MG/DL (ref 0.2–1.2)
BUN SERPL-MCNC: 11 MG/DL (ref 8–23)
BUN/CREAT SERPL: 17.5 (ref 7–25)
CALCIUM SERPL-MCNC: 9.1 MG/DL (ref 8.6–10.5)
CHLORIDE SERPL-SCNC: 100 MMOL/L (ref 98–107)
CHOLEST SERPL-MCNC: 174 MG/DL (ref 0–200)
CO2 SERPL-SCNC: 27.1 MMOL/L (ref 22–29)
CREAT SERPL-MCNC: 0.63 MG/DL (ref 0.57–1)
GLOBULIN SER CALC-MCNC: 2.5 GM/DL
GLUCOSE SERPL-MCNC: 89 MG/DL (ref 65–99)
HDLC SERPL-MCNC: 66 MG/DL (ref 40–60)
LDLC SERPL CALC-MCNC: 94 MG/DL (ref 0–100)
LDLC/HDLC SERPL: 1.42 {RATIO}
POTASSIUM SERPL-SCNC: 4.1 MMOL/L (ref 3.5–5.2)
PROT SERPL-MCNC: 6.7 G/DL (ref 6–8.5)
SODIUM SERPL-SCNC: 138 MMOL/L (ref 136–145)
TRIGL SERPL-MCNC: 72 MG/DL (ref 0–150)
VLDLC SERPL CALC-MCNC: 14.4 MG/DL

## 2020-01-24 ENCOUNTER — OFFICE VISIT (OUTPATIENT)
Dept: FAMILY MEDICINE CLINIC | Facility: CLINIC | Age: 65
End: 2020-01-24

## 2020-01-24 ENCOUNTER — RESULTS ENCOUNTER (OUTPATIENT)
Dept: FAMILY MEDICINE CLINIC | Facility: CLINIC | Age: 65
End: 2020-01-24

## 2020-01-24 VITALS
BODY MASS INDEX: 23.11 KG/M2 | SYSTOLIC BLOOD PRESSURE: 124 MMHG | WEIGHT: 156 LBS | OXYGEN SATURATION: 97 % | DIASTOLIC BLOOD PRESSURE: 76 MMHG | HEIGHT: 69 IN | HEART RATE: 63 BPM | TEMPERATURE: 99.2 F | RESPIRATION RATE: 16 BRPM

## 2020-01-24 DIAGNOSIS — G43.109 MIGRAINE WITH AURA AND WITHOUT STATUS MIGRAINOSUS, NOT INTRACTABLE: ICD-10-CM

## 2020-01-24 DIAGNOSIS — M51.36 DDD (DEGENERATIVE DISC DISEASE), LUMBAR: ICD-10-CM

## 2020-01-24 DIAGNOSIS — Z12.11 COLON CANCER SCREENING: ICD-10-CM

## 2020-01-24 DIAGNOSIS — Z00.00 ROUTINE GENERAL MEDICAL EXAMINATION AT A HEALTH CARE FACILITY: Primary | ICD-10-CM

## 2020-01-24 PROCEDURE — 99213 OFFICE O/P EST LOW 20 MIN: CPT | Performed by: FAMILY MEDICINE

## 2020-01-24 PROCEDURE — 99396 PREV VISIT EST AGE 40-64: CPT | Performed by: FAMILY MEDICINE

## 2020-01-24 NOTE — PATIENT INSTRUCTIONS
Annual  Wellness  Personal Prevention Counseling and Plan of Service     Date of Office Visit:  2020  Encounter Provider:  Billy Poole MD  Place of Service:  [facility] PRIMARY CARE  Patient Name: Linda Cooper  :  1955    As part of the  Annual Wellness portion of your visit today, we are providing you with this personalized preventive plan of services (PPPS). This plan is based upon recommendations of the [place] Preventive Services Task Force (USPSTF) and the Advisory Committee on Immunization Practices (ACIP).    This lists the preventive care services that should be considered, and provides dates of when you are due. Items listed as completed are up-to-date and do not require any further intervention.    Health Maintenance   Topic Date Due   • TDAP/TD VACCINES (1 - Tdap) 1967   • MAMMOGRAM  02/10/2016   • COLONOSCOPY  2018   • INFLUENZA VACCINE  2019   • ZOSTER VACCINE (2 of 2) 10/21/2019   • ANNUAL PHYSICAL  2021   • PNEUMOCOCCAL VACCINE (19-64 MEDIUM RISK)  Completed   • HEPATITIS C SCREENING  Completed   • PAP SMEAR  Discontinued       Orders Placed This Encounter   Procedures   • Cologuard - Stool, Per Rectum     Standing Status:   Future     Standing Expiration Date:   2021       Return in about 6 months (around 2020) for Recheck.

## 2020-01-24 NOTE — PROGRESS NOTES
"Preventive Exam    History of Present Illness: Linda is here for check up and review of routine health maintenance. She states she is doing well and we addressed the following health concerns:.  Migraine headaches. This is a chronic problem and she has been taking Maxalt as needed.   She has chronic back pain and has been cared for by surgery in the past. She now manages chronic pain issues with gabapentin. The current dose is working well for her.     REVIEW OF SYSTEMS  Constitutional: Negative.    HENT: Negative.    Eyes: Negative.    Respiratory: Negative.    Cardiovascular: Negative.    Gastrointestinal: Negative.    Endocrine: Negative.    Genitourinary: Negative.    Musculoskeletal: Negative.  Skin: Negative.    Allergic/Immunologic: Negative.    Neurological: Negative.    Hematological: Negative.    Psychiatric/Behavioral: Negative.    I have reviewed the ROS as documented by the MA. Billy Poole MD       PHYSICAL EXAM    Vitals:    01/24/20 1041   BP: 124/76   Pulse: 63   Resp: 16   Temp: 99.2 °F (37.3 °C)   SpO2: 97%   Weight: 70.8 kg (156 lb)   Height: 175.3 cm (69\")     GENERAL: alert and oriented, afebrile and vital signs stable  HEENT: oral mucosa moist, PEERLA, EOM, conjunctiva normal  No cervical adenopathy  LUNGS: clear to ascultation bilaterally, no rales, ronchi or wheezing  HEART: RRR S1 S2 without murmers, thrills, rubs or gallops  CHEST WALL: within normal limits, no tenderness  ABDOMEN: WNL. Normal BS.  EXTREMITIES: No clubbing, cyanosis or edema noted. Normal Pulses.  SKIN: warm, dry, no rashes noted  NEURO: CN II- XII grossly intact    ASSESSMENT AND PLAN  Problem List Items Addressed This Visit        Cardiovascular and Mediastinum    Migraine with aura    Overview     This is a chronic problem that responds well to Maxalt.             Musculoskeletal and Integument    DDD (degenerative disc disease), lumbar    Overview     She is taking gabapentin after back surgery in 2019. This helps " with discomfort.  The patient has read and signed the [facility] Controlled Substance Contract.  I will continue to see patient for regular follow up appointments.  She are well controlled on current medication.  KHOI has been reviewed by me and is updated every 3 months. The patient is aware of the potential for addiction and dependence.               Other Visit Diagnoses     Routine general medical examination at a health care facility    -  Primary    Colon cancer screening        Relevant Orders    Cologuard - Stool, Per Rectum        Routine health maintenance reviewed and discussed with Linda.  Labs reviewed with Linda and on her chart.     Return in about 6 months (around 7/24/2020) for Recheck.

## 2020-02-07 ENCOUNTER — TELEPHONE (OUTPATIENT)
Dept: FAMILY MEDICINE CLINIC | Facility: CLINIC | Age: 65
End: 2020-02-07

## 2020-02-07 NOTE — TELEPHONE ENCOUNTER
Patient is now feeling better and  wants to know when she can come in to get second shingles and the flu shot.    Please advise  Patient call back [phone]

## 2020-02-21 ENCOUNTER — CLINICAL SUPPORT (OUTPATIENT)
Dept: FAMILY MEDICINE CLINIC | Facility: CLINIC | Age: 65
End: 2020-02-21

## 2020-02-21 DIAGNOSIS — Z23 NEED FOR VACCINATION: Primary | ICD-10-CM

## 2020-02-21 PROCEDURE — 90750 HZV VACC RECOMBINANT IM: CPT | Performed by: FAMILY MEDICINE

## 2020-02-21 PROCEDURE — 90471 IMMUNIZATION ADMIN: CPT | Performed by: FAMILY MEDICINE

## 2020-03-09 DIAGNOSIS — G43.109 MIGRAINE WITH AURA AND WITHOUT STATUS MIGRAINOSUS, NOT INTRACTABLE: ICD-10-CM

## 2020-03-09 RX ORDER — RIZATRIPTAN BENZOATE 10 MG/1
10 TABLET ORAL ONCE AS NEEDED
Qty: 9 TABLET | Refills: 2 | Status: SHIPPED | OUTPATIENT
Start: 2020-03-09

## 2020-03-10 ENCOUNTER — FLU SHOT (OUTPATIENT)
Dept: FAMILY MEDICINE CLINIC | Facility: CLINIC | Age: 65
End: 2020-03-10

## 2020-03-10 DIAGNOSIS — Z23 NEED FOR VACCINATION: Primary | ICD-10-CM

## 2020-03-10 PROCEDURE — 90686 IIV4 VACC NO PRSV 0.5 ML IM: CPT | Performed by: FAMILY MEDICINE

## 2020-03-10 PROCEDURE — 90471 IMMUNIZATION ADMIN: CPT | Performed by: FAMILY MEDICINE

## 2020-03-13 ENCOUNTER — APPOINTMENT (OUTPATIENT)
Dept: WOMENS IMAGING | Facility: HOSPITAL | Age: 65
End: 2020-03-13

## 2020-03-13 PROCEDURE — 77067 SCR MAMMO BI INCL CAD: CPT | Performed by: RADIOLOGY

## 2020-03-13 PROCEDURE — 77063 BREAST TOMOSYNTHESIS BI: CPT | Performed by: RADIOLOGY

## 2020-05-19 DIAGNOSIS — M51.36 DDD (DEGENERATIVE DISC DISEASE), LUMBAR: ICD-10-CM

## 2020-05-19 DIAGNOSIS — M54.31 SCIATICA OF RIGHT SIDE: ICD-10-CM

## 2020-05-19 RX ORDER — GABAPENTIN 300 MG/1
CAPSULE ORAL
Qty: 60 CAPSULE | Refills: 3 | Status: SHIPPED | OUTPATIENT
Start: 2020-05-19 | End: 2020-10-02

## 2020-10-02 DIAGNOSIS — M54.31 SCIATICA OF RIGHT SIDE: ICD-10-CM

## 2020-10-02 DIAGNOSIS — M51.36 DDD (DEGENERATIVE DISC DISEASE), LUMBAR: ICD-10-CM

## 2020-10-02 RX ORDER — GABAPENTIN 300 MG/1
CAPSULE ORAL
Qty: 60 CAPSULE | Refills: 0 | Status: SHIPPED | OUTPATIENT
Start: 2020-10-02 | End: 2020-10-15 | Stop reason: SDUPTHER

## 2020-10-15 ENCOUNTER — OFFICE VISIT (OUTPATIENT)
Dept: FAMILY MEDICINE CLINIC | Facility: CLINIC | Age: 65
End: 2020-10-15

## 2020-10-15 VITALS
RESPIRATION RATE: 15 BRPM | HEIGHT: 69 IN | HEART RATE: 66 BPM | WEIGHT: 153 LBS | DIASTOLIC BLOOD PRESSURE: 74 MMHG | TEMPERATURE: 97.1 F | SYSTOLIC BLOOD PRESSURE: 122 MMHG | OXYGEN SATURATION: 99 % | BODY MASS INDEX: 22.66 KG/M2

## 2020-10-15 DIAGNOSIS — M54.41 CHRONIC BILATERAL LOW BACK PAIN WITH RIGHT-SIDED SCIATICA: Primary | ICD-10-CM

## 2020-10-15 DIAGNOSIS — G89.29 CHRONIC BILATERAL LOW BACK PAIN WITH RIGHT-SIDED SCIATICA: Primary | ICD-10-CM

## 2020-10-15 DIAGNOSIS — M54.31 SCIATICA OF RIGHT SIDE: ICD-10-CM

## 2020-10-15 DIAGNOSIS — M51.36 DDD (DEGENERATIVE DISC DISEASE), LUMBAR: ICD-10-CM

## 2020-10-15 DIAGNOSIS — G43.109 MIGRAINE WITH AURA AND WITHOUT STATUS MIGRAINOSUS, NOT INTRACTABLE: ICD-10-CM

## 2020-10-15 PROCEDURE — 99214 OFFICE O/P EST MOD 30 MIN: CPT | Performed by: FAMILY MEDICINE

## 2020-10-15 RX ORDER — GABAPENTIN 300 MG/1
300 CAPSULE ORAL 2 TIMES DAILY
Qty: 60 CAPSULE | Refills: 5 | Status: SHIPPED | OUTPATIENT
Start: 2020-10-15 | End: 2020-12-16 | Stop reason: SDUPTHER

## 2020-10-15 NOTE — PROGRESS NOTES
Subjective   Linda Cooper is a 65 y.o. female.   Migraine and Back Pain (Med Refill)    History of Present Illness   Linda is here for medication follow up.  She takes Gabapentin because of her low back pain and she is here for 6 mo fup.  She states she is doing well with this and wishes to continue.  She has a long history of back pain and is tried multiple over-the-counter medications with little relief.  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  Migraine HA - she is now taking Excedrin and Maxalt.  And she finds that this particular combination works really well to keep her headaches under good control.  The following portions of the patient's history were reviewed and updated as appropriate: allergies, current medications, past family history, past medical history, past social history, past surgical history and problem list.    Review of Systems   Constitutional: Negative for fever.   Respiratory: Negative for cough and shortness of breath.    Musculoskeletal: Positive for back pain.   Neurological: Positive for headache.   All other systems reviewed and are negative.      Objective   Physical Exam  Vitals signs and nursing note reviewed.   Constitutional:       Appearance: She is well-developed.   HENT:      Head: Normocephalic and atraumatic.   Eyes:      Pupils: Pupils are equal, round, and reactive to light.   Neck:      Musculoskeletal: Normal range of motion.   Cardiovascular:      Rate and Rhythm: Normal rate and regular rhythm.   Pulmonary:      Effort: Pulmonary effort is normal.   Skin:     General: Skin is warm and dry.      Findings: No rash.   Neurological:      Mental Status: She is alert and oriented to person, place, and time.   Psychiatric:         Behavior: Behavior normal.           Assessment/Plan   Problem List Items Addressed This Visit        Cardiovascular and Mediastinum    Migraine with aura    Overview     This is a chronic  problem that responds well to Maxalt.          Relevant Medications    gabapentin (NEURONTIN) 300 MG capsule       Nervous and Auditory    LBP (low back pain) - Primary       Musculoskeletal and Integument    DDD (degenerative disc disease), lumbar    Overview     She is taking gabapentin after back surgery in 2019. This helps with discomfort.  The patient has read and signed the [facility] Controlled Substance Contract.  I will continue to see patient for regular follow up appointments.  She are well controlled on current medication.  KHOI has been reviewed by me and is updated every 3 months. The patient is aware of the potential for addiction and dependence.             Relevant Medications    gabapentin (NEURONTIN) 300 MG capsule      Other Visit Diagnoses     Sciatica of right side        Relevant Medications    gabapentin (NEURONTIN) 300 MG capsule               Return in about 6 months (around 4/15/2021).   Answers for HPI/ROS submitted by the patient on 10/13/2020   What is the primary reason for your visit?: Other  Please describe your symptoms.: prescription renwal, sinus headaches, mild depression  Have you had these symptoms before?: Yes  How long have you been having these symptoms?: Greater than 2 weeks  Please list any medications you are currently taking for this condition.: advil for sinus, chocolate

## 2020-10-21 ENCOUNTER — FLU SHOT (OUTPATIENT)
Dept: FAMILY MEDICINE CLINIC | Facility: CLINIC | Age: 65
End: 2020-10-21

## 2020-10-21 DIAGNOSIS — Z23 NEED FOR INFLUENZA VACCINATION: ICD-10-CM

## 2020-10-21 PROCEDURE — 90686 IIV4 VACC NO PRSV 0.5 ML IM: CPT | Performed by: FAMILY MEDICINE

## 2020-10-21 PROCEDURE — 90471 IMMUNIZATION ADMIN: CPT | Performed by: FAMILY MEDICINE

## 2020-12-15 ENCOUNTER — TELEPHONE (OUTPATIENT)
Dept: FAMILY MEDICINE CLINIC | Facility: CLINIC | Age: 65
End: 2020-12-15

## 2020-12-15 NOTE — TELEPHONE ENCOUNTER
PATIENT IS GOING TO BE IN ANOTHER STATE UNTIL MIDDLE OF February AND USUALLY TRANSFERS IT TO THE PHARMACY IN THE OTHER STATE    gabapentin (NEURONTIN) 300 MG capsule    WANT TO DISCUSS WITH DR GLOVER WHAT TO DO IN ORDER TO GET MEDICATION FILLED AT BOTH LOCATIONS/PHARMACY CANNOT DO ANYTHING ABOUT DOING A 2ND PRESCRIPTION SO SHE IS CALLING DR GLOVER TO SEE IF CAN GET 60 DAY OR 90 SUPPLY FOR WHEN SHE GOES OUT OF TOWN, LEAVING TOWN NEXT WEEK 12.21.20

## 2020-12-16 DIAGNOSIS — M54.31 SCIATICA OF RIGHT SIDE: ICD-10-CM

## 2020-12-16 DIAGNOSIS — M51.36 DDD (DEGENERATIVE DISC DISEASE), LUMBAR: ICD-10-CM

## 2020-12-17 RX ORDER — GABAPENTIN 300 MG/1
300 CAPSULE ORAL 2 TIMES DAILY
Qty: 180 CAPSULE | Refills: 0 | Status: SHIPPED | OUTPATIENT
Start: 2020-12-17 | End: 2020-12-18 | Stop reason: SDUPTHER

## 2020-12-18 DIAGNOSIS — M54.31 SCIATICA OF RIGHT SIDE: ICD-10-CM

## 2020-12-18 DIAGNOSIS — M51.36 DDD (DEGENERATIVE DISC DISEASE), LUMBAR: ICD-10-CM

## 2020-12-18 RX ORDER — GABAPENTIN 300 MG/1
300 CAPSULE ORAL 2 TIMES DAILY
Qty: 180 CAPSULE | Refills: 0 | Status: SHIPPED | OUTPATIENT
Start: 2020-12-18 | End: 2021-07-06 | Stop reason: SDUPTHER

## 2020-12-18 NOTE — TELEPHONE ENCOUNTER
Caller: [facility]/PHARMACY #[id number] - [place], [place] - [address] AT IN THE [facility] [phone] [facility] [phone] FX ([place])    Relationship: Pharmacy    Best call back number: [phone]    Medication needed:   Requested Prescriptions     Pending Prescriptions Disp Refills   • gabapentin (NEURONTIN) 300 MG capsule 180 capsule 0     Sig: Take 1 capsule by mouth 2 (Two) Times a Day.       When do you need the refill by: BEFORE WEDNESDAY    What details did the patient provide when requesting the medication: PT IS LEAVING [facility] Wednesday AND WILL BE GONE FOR TWO MONTHS. REFILL NOT DUE TILL January, BUT THEY  NEED APPROVAL TO FILL BEFORE SHE LEAVES.    Does the patient have less than a 3 day supply:  [] Yes  [x] No    What is the patient's preferred pharmacy: [facility]/PHARMACY #[id number] - [place], [place] - [address] AT IN THE [facility] [phone] [facility] [phone] FX

## 2020-12-18 NOTE — PROGRESS NOTES
Subjective   Patient ID: Linda Cooper is a 65 y.o. female is here today for follow-up.     She was last seen in office 4/16/19 for back pain. L2-L4 open bilateral lumbar decompression 1/17/19. She was going to  at that time.     She returns to the office with flare up of back pain 2 months ago. Today, she is not having any pain but does have some back weakness. She takes Aleve 220 mg prn and Gabapentin 300-600 mg HS for pain.     It has been almost 2 years since this very [facility] underwent a decompression at L2-L3 and L3-L4 with a right-sided L2-L3 discectomy.  She had a previous L4-L5 discectomy by me some years before that and an L5-S1 discectomy in the 1990s.  She only has intermittent leg symptoms but she was concerned over the summer that she had an episode of some back pain and still feels that her back is a bit weak and less able to support herself.  But things have turned around when she started some Pilates and that seems to help her quite a bit.  On examination today she has good range of motion particularly in flexion and no motor deficits.  I told her that with every surgery 1 undergoes there is the possibility of spinal instability which we should check for but I doubt that even if present it is all that clinically significant given her physical examination today.  I encouraged her to continue her Pilates and keep working on her yoga.  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  She will be going to [place] in another day or so.  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  I told her that we should check a flexion-extension film which she can get done there and she will send the CD to us so I can review it and will do a televisit in about a month afterwards.  She will let me know if there is any recurrent pain in the legs.  I doubt that there is any clinically significant translational instability but I had like to check for  "it.        Back Pain  The problem occurs intermittently. The patient is experiencing no pain. Associated symptoms include leg pain and numbness (R heel ). Pertinent negatives include no bladder incontinence, bowel incontinence or fever.   Leg Pain   The pain has been intermittent since onset. Associated symptoms include numbness (R heel ).       The following portions of the patient's history were reviewed and updated as appropriate: allergies, current medications, past family history, past medical history, past social history, past surgical history and problem list.    Review of Systems   Constitutional: Negative for fever.   Gastrointestinal: Negative for bowel incontinence.   Genitourinary: Negative for bladder incontinence.   Musculoskeletal: Positive for back pain (R>L leg pain ).   Neurological: Positive for numbness (R heel ).   All other systems reviewed and are negative.          Objective     Vitals:    12/21/20 1401   BP: 111/77   Pulse: 66   Temp: 97.7 °F (36.5 °C)   Weight: 69.9 kg (154 lb)   Height: 175.3 cm (69\")     Body mass index is 22.74 kg/m².      Physical Exam  Constitutional:       Appearance: She is well-developed.   HENT:      Head: Normocephalic and atraumatic.   Eyes:      Extraocular Movements: EOM normal.      Conjunctiva/sclera: Conjunctivae normal.      Pupils: Pupils are equal, round, and reactive to light.      Funduscopic exam:     Right eye: No papilledema.         Left eye: No papilledema.   Neck:      Vascular: No carotid bruit.   Neurological:      Mental Status: She is oriented to person, place, and time.      Coordination: Finger-Nose-Finger Test and Heel to Shin Test normal.      Gait: Gait is intact.      Deep Tendon Reflexes:      Reflex Scores:       Tricep reflexes are 2+ on the right side and 2+ on the left side.       Bicep reflexes are 2+ on the right side and 2+ on the left side.       Brachioradialis reflexes are 2+ on the right side and 2+ on the left side.       " Patellar reflexes are 2+ on the right side and 2+ on the left side.       Achilles reflexes are 2+ on the right side and 2+ on the left side.  Psychiatric:         Speech: Speech normal.       Neurologic Exam     Mental Status   Oriented to person, place, and time.   Registration of memory: Good recent and remote memory.   Attention: normal. Concentration: normal.   Speech: speech is normal   Level of consciousness: alert  Knowledge: consistent with education.     Cranial Nerves     CN II   Visual fields full to confrontation.   Visual acuity: normal    CN III, IV, VI   Pupils are equal, round, and reactive to light.  Extraocular motions are normal.     CN V   Facial sensation intact.   Right corneal reflex: normal  Left corneal reflex: normal    CN VII   Facial expression full, symmetric.   Right facial weakness: none  Left facial weakness: none    CN VIII   Hearing: intact    CN IX, X   Palate: symmetric    CN XI   Right sternocleidomastoid strength: normal  Left sternocleidomastoid strength: normal    CN XII   Tongue: not atrophic  Tongue deviation: none    Motor Exam   Muscle bulk: normal  Right arm tone: normal  Left arm tone: normal  Right leg tone: normal  Left leg tone: normal    Strength   Strength 5/5 except as noted.     Sensory Exam   Light touch normal.     Gait, Coordination, and Reflexes     Gait  Gait: normal    Coordination   Finger to nose coordination: normal  Heel to shin coordination: normal    Reflexes   Right brachioradialis: 2+  Left brachioradialis: 2+  Right biceps: 2+  Left biceps: 2+  Right triceps: 2+  Left triceps: 2+  Right patellar: 2+  Left patellar: 2+  Right achilles: 2+  Left achilles: 2+  Right : 2+  Left : 2+          Assessment/Plan   Independent Review of Radiographic Studies:      I personally reviewed the images from the following studies.    I reviewed her intraoperative x-rays done on 1/17/2019 which show positioning of the markers at L2-L3 and L3-L4.  Agree with  the report.        Medical Decision Making:      Her episode of back pain seems to have resolved and she is working on strengthening with Pilates which I encouraged her to continue.  We will go ahead and get flexion-extension films in [place] because that is where she is going.  She will mail the CD back to us and I will do a televisit afterwards and perhaps about a month to let her know what those look like.  If she develops recurrent leg pain or weakness or increased back pain she will let us know.      Diagnoses and all orders for this visit:    1. Chronic midline low back pain without sciatica (Primary)  -     XR Spine Lumbar Complete With Flex & Ext; Future    2. DDD (degenerative disc disease), lumbar  -     XR Spine Lumbar Complete With Flex & Ext; Future    3. History of spinal surgery  -     XR Spine Lumbar Complete With Flex & Ext; Future      Return in about 4 weeks (around 1/18/2021) for As a televisit after she has sent the CD of her x-rays from [place]..

## 2020-12-21 ENCOUNTER — OFFICE VISIT (OUTPATIENT)
Dept: NEUROSURGERY | Facility: CLINIC | Age: 65
End: 2020-12-21

## 2020-12-21 VITALS
HEART RATE: 66 BPM | WEIGHT: 154 LBS | SYSTOLIC BLOOD PRESSURE: 111 MMHG | DIASTOLIC BLOOD PRESSURE: 77 MMHG | HEIGHT: 69 IN | TEMPERATURE: 97.7 F | BODY MASS INDEX: 22.81 KG/M2

## 2020-12-21 DIAGNOSIS — M54.50 CHRONIC MIDLINE LOW BACK PAIN WITHOUT SCIATICA: Primary | ICD-10-CM

## 2020-12-21 DIAGNOSIS — M51.36 DDD (DEGENERATIVE DISC DISEASE), LUMBAR: ICD-10-CM

## 2020-12-21 DIAGNOSIS — Z98.890 HISTORY OF SPINAL SURGERY: ICD-10-CM

## 2020-12-21 DIAGNOSIS — G89.29 CHRONIC MIDLINE LOW BACK PAIN WITHOUT SCIATICA: Primary | ICD-10-CM

## 2020-12-21 PROCEDURE — 99213 OFFICE O/P EST LOW 20 MIN: CPT | Performed by: NEUROLOGICAL SURGERY

## 2021-03-17 ENCOUNTER — HOSPITAL ENCOUNTER (OUTPATIENT)
Dept: GENERAL RADIOLOGY | Facility: HOSPITAL | Age: 66
Discharge: HOME | End: 2021-03-17
Admitting: NEUROLOGICAL SURGERY

## 2021-03-17 DIAGNOSIS — M51.36 DDD (DEGENERATIVE DISC DISEASE), LUMBAR: ICD-10-CM

## 2021-03-17 DIAGNOSIS — G89.29 CHRONIC MIDLINE LOW BACK PAIN WITHOUT SCIATICA: ICD-10-CM

## 2021-03-17 DIAGNOSIS — Z98.890 HISTORY OF SPINAL SURGERY: ICD-10-CM

## 2021-03-17 DIAGNOSIS — M54.50 CHRONIC MIDLINE LOW BACK PAIN WITHOUT SCIATICA: ICD-10-CM

## 2021-03-17 PROCEDURE — 72114 X-RAY EXAM L-S SPINE BENDING: CPT

## 2021-03-18 ENCOUNTER — OFFICE VISIT (OUTPATIENT)
Dept: FAMILY MEDICINE CLINIC | Facility: CLINIC | Age: 66
End: 2021-03-18

## 2021-03-18 ENCOUNTER — TELEPHONE (OUTPATIENT)
Dept: NEUROSURGERY | Facility: CLINIC | Age: 66
End: 2021-03-18

## 2021-03-18 VITALS
RESPIRATION RATE: 16 BRPM | OXYGEN SATURATION: 98 % | DIASTOLIC BLOOD PRESSURE: 66 MMHG | HEIGHT: 69 IN | SYSTOLIC BLOOD PRESSURE: 110 MMHG | WEIGHT: 152 LBS | TEMPERATURE: 97.7 F | BODY MASS INDEX: 22.51 KG/M2 | HEART RATE: 72 BPM

## 2021-03-18 DIAGNOSIS — M54.50 CHRONIC MIDLINE LOW BACK PAIN WITHOUT SCIATICA: ICD-10-CM

## 2021-03-18 DIAGNOSIS — Z00.00 ROUTINE GENERAL MEDICAL EXAMINATION AT A HEALTH CARE FACILITY: Primary | ICD-10-CM

## 2021-03-18 DIAGNOSIS — G43.109 MIGRAINE WITH AURA AND WITHOUT STATUS MIGRAINOSUS, NOT INTRACTABLE: ICD-10-CM

## 2021-03-18 DIAGNOSIS — G89.29 CHRONIC MIDLINE LOW BACK PAIN WITHOUT SCIATICA: ICD-10-CM

## 2021-03-18 DIAGNOSIS — M85.88 OSTEOPENIA OF LUMBAR SPINE: ICD-10-CM

## 2021-03-18 PROBLEM — Z09 SURGERY FOLLOW-UP EXAMINATION: Status: RESOLVED | Noted: 2019-02-01 | Resolved: 2021-03-18

## 2021-03-18 LAB
ALBUMIN SERPL-MCNC: 4.3 G/DL (ref 3.5–5.2)
ALBUMIN/GLOB SERPL: 1.6 G/DL
ALP SERPL-CCNC: 67 U/L (ref 39–117)
ALT SERPL-CCNC: 15 U/L (ref 1–33)
AST SERPL-CCNC: 25 U/L (ref 1–32)
BILIRUB SERPL-MCNC: 0.3 MG/DL (ref 0–1.2)
BUN SERPL-MCNC: 13 MG/DL (ref 8–23)
BUN/CREAT SERPL: 21.7 (ref 7–25)
CALCIUM SERPL-MCNC: 9.8 MG/DL (ref 8.6–10.5)
CHLORIDE SERPL-SCNC: 100 MMOL/L (ref 98–107)
CHOLEST SERPL-MCNC: 175 MG/DL (ref 0–200)
CHOLEST/HDLC SERPL: 2.87 {RATIO}
CO2 SERPL-SCNC: 29.2 MMOL/L (ref 22–29)
CREAT SERPL-MCNC: 0.6 MG/DL (ref 0.57–1)
ERYTHROCYTE [DISTWIDTH] IN BLOOD BY AUTOMATED COUNT: 12.8 % (ref 12.3–15.4)
GLOBULIN SER CALC-MCNC: 2.7 GM/DL
GLUCOSE SERPL-MCNC: 90 MG/DL (ref 65–99)
HCT VFR BLD AUTO: 41.5 % (ref 34–46.6)
HDLC SERPL-MCNC: 61 MG/DL (ref 40–60)
HGB BLD-MCNC: 13.6 G/DL (ref 12–15.9)
LDLC SERPL CALC-MCNC: 100 MG/DL (ref 0–100)
MCH RBC QN AUTO: 30.2 PG (ref 26.6–33)
MCHC RBC AUTO-ENTMCNC: 32.8 G/DL (ref 31.5–35.7)
MCV RBC AUTO: 92 FL (ref 79–97)
PLATELET # BLD AUTO: 265 10*3/MM3 (ref 140–450)
POTASSIUM SERPL-SCNC: 5.2 MMOL/L (ref 3.5–5.2)
PROT SERPL-MCNC: 7 G/DL (ref 6–8.5)
RBC # BLD AUTO: 4.51 10*6/MM3 (ref 3.77–5.28)
SODIUM SERPL-SCNC: 132 MMOL/L (ref 136–145)
TRIGL SERPL-MCNC: 76 MG/DL (ref 0–150)
VLDLC SERPL CALC-MCNC: 14 MG/DL (ref 5–40)
WBC # BLD AUTO: 4.78 10*3/MM3 (ref 3.4–10.8)

## 2021-03-18 PROCEDURE — 99396 PREV VISIT EST AGE 40-64: CPT | Performed by: FAMILY MEDICINE

## 2021-03-18 PROCEDURE — 99214 OFFICE O/P EST MOD 30 MIN: CPT | Performed by: FAMILY MEDICINE

## 2021-03-18 NOTE — PROGRESS NOTES
"Preventive Exam    History of Present Illness: Linda is here for check up and review of routine health maintenance. She states she is doing well and has no concerns.    Migraine headaches. This is a chronic problem and she has been taking Maxalt as needed.  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  And she is wondering if there is anything more effective that could stop her headache and allow her to work at the same time.    She has chronic back pain and has been cared for by surgery in the past. She now manages chronic pain issues with gabapentin. The current dose is working well for her.     Pap Smear:s/p total hys  Mammogram: scheduled  Colon cancer screening:UTD  Tobacco use :current smoker, 2.5 pack year hx  Bone Density:UTD      REVIEW OF SYSTEMS  Constitutional: Negative.    HENT: Negative.    Eyes: Negative.    Respiratory: Negative.    Cardiovascular: Negative.    Gastrointestinal: Negative.    Endocrine: Negative.    Genitourinary: Negative.    Musculoskeletal: Negative.  Skin: Negative.    Allergic/Immunologic: Negative.    Neurological: Negative.    Hematological: Negative.    Psychiatric/Behavioral: Negative.    I have reviewed the ROS as documented by the MA. Billy Poole MD       PHYSICAL EXAM    Vitals:    03/18/21 1007   BP: 110/66   Pulse: 72   Resp: 16   Temp: 97.7 °F (36.5 °C)   SpO2: 98%   Weight: 68.9 kg (152 lb)   Height: 175.3 cm (69\")     GENERAL: alert and oriented, afebrile and vital signs stable  HEENT: oral mucosa moist, PEERLA, EOM, conjunctiva normal  No cervical adenopathy  LUNGS: clear to ascultation bilaterally, no rales, ronchi or wheezing  HEART: RRR S1 S2 without murmers, thrills, rubs or gallops  CHEST WALL: within normal limits, no tenderness oted  ABDOMEN: WNL. Normal BS.  EXTREMITIES: No clubbing, cyanosis or edema noted. Normal Pulses.  SKIN: warm, dry, no rashes noted  NEURO: CN II- XII grossly intact  PSYCH: good mood and positive " affect  ASSESSMENT AND PLAN  Problem List Items Addressed This Visit        Musculoskeletal and Injuries    Chronic midline low back pain without sciatica    Overview     She has chronic pain and she is taking gabapentin and doing well.The patient has read and signed the [facility] Controlled Substance Contract.  I will continue to see patient for regular follow up appointments.  She are well controlled on current medication.  KHOI has been reviewed by me and is updated every 3 months. The patient is aware of the potential for addiction and dependence.             Osteopenia of lumbar spine  She is taking Pilates and that seems to be helping with many women's bone density.  I have advised her to continue.       Neuro    Migraine with aura    Overview     This is a chronic problem that had responded well to Maxalt in the past but is not working as well for her as we would like.  I have given her a sample of Ubrelvy to try to see if this works better for her.  She will let me know if she needs refills of this medication.           Relevant Medications    ubrogepant (UBRELVY) 50 MG tablet      Other Visit Diagnoses     Routine general medical examination at a health care facility    -  Primary    Relevant Orders    CBC (No Diff)    Comprehensive Metabolic Panel    Lipid Panel With / Chol / HDL Ratio        Routine health maintenance reviewed and discussed with Linda.  The patient was counseled regarding a healthy diet and exercise, appropriate routine screening and immunizations and encouraged to get regular dental and eye exams .      Return in about 6 months (around 9/18/2021).

## 2021-03-18 NOTE — TELEPHONE ENCOUNTER
----- Message from Edson Camarillo MD sent at 3/17/2021  6:26 PM EDT -----  I left 2 voicemails.  The last one said that there really was not anything disturbing on her x-rays.  And if she was otherwise doing okay we can keep it open-ended.  Please call her to make sure she got that message and she wants to keep it open-ended, that is fine.  Just let me know.

## 2021-03-18 NOTE — PATIENT INSTRUCTIONS
Annual Wellness  Personal Prevention Plan of Service   Try Ubrelvy for migraines  Date of Office Visit:  2021  Encounter Provider:  Billy Poole MD  Place of Service:  [facility] PR[facility]  Patient Name: Linda Cooper  :  1955    As part of the Annual Wellness portion of your visit today, we are providing you with this personalized preventive plan of services (PPPS). This plan is based upon recommendations of the [place] Preventive Services Task Force (USPSTF) and the Advisory Committee on Immunization Practices (ACIP).    This lists the preventive care services that should be considered, and provides dates of when you are due. Items listed as completed are up-to-date and do not require any further intervention.    Health Maintenance   Topic Date Due   • MAMMOGRAM  Never done   • TDAP/TD VACCINES (1 - Tdap) 2021 (Originally 1975)   • COVID-19 Vaccine (2 - Pfizer 2-dose series) 2021   • ANNUAL PHYSICAL  2022   • COLOGUARD  10/28/2023   • HEPATITIS C SCREENING  Completed   • Pneumococcal Vaccine 0-64  Completed   • INFLUENZA VACCINE  Completed   • ZOSTER VACCINE  Completed   • MENINGOCOCCAL VACCINE  Aged Out   • PAP SMEAR  Discontinued       Orders Placed This Encounter   Procedures   • CBC (No Diff)   • Comprehensive Metabolic Panel   • Lipid Panel With / Chol / HDL Ratio       Return in about 6 months (around 2021).

## 2021-03-19 ENCOUNTER — TELEPHONE (OUTPATIENT)
Dept: NEUROSURGERY | Facility: CLINIC | Age: 66
End: 2021-03-19

## 2021-04-08 ENCOUNTER — TELEPHONE (OUTPATIENT)
Dept: FAMILY MEDICINE CLINIC | Facility: CLINIC | Age: 66
End: 2021-04-08

## 2021-04-08 NOTE — TELEPHONE ENCOUNTER
Caller: JEAN CARLOS GABRIEL    Relationship to patient: PATIENT    Best call back number: [phone]     Patient is needing: PATIENT IS CALLING TO REQUEST SAMPLES AND ALSO AN RX OF THE FOLLOWING MEDICATION.   SHE STATES  MG OF UBRELVY HELPS, BUT THE 50 MG DID NOT WORK WELL.    ubrogepant (UBRELVY) 100 MG tablet    SHE IS ALSO REQUESTING A SAMPLE OF THE NETI POT.      PATIENT WOULD LIKE TO  ON Friday, AND IS ASKING FOR A CALL WHEN READY.          [facility]/pharmacy #[id number] - [place], [place] - [address]. AT IN THE [place] - [phone]  - [phone]   [phone]

## 2021-04-19 ENCOUNTER — TELEPHONE (OUTPATIENT)
Dept: FAMILY MEDICINE CLINIC | Facility: CLINIC | Age: 66
End: 2021-04-19

## 2021-04-19 NOTE — TELEPHONE ENCOUNTER
Medication isn't covered.  It's a plan exclusion.    I called the pt and left a vm.    Advised pt to call us with any questions

## 2021-04-19 NOTE — TELEPHONE ENCOUNTER
GURMEET WITH [employer] STATES THAT THE PATIENT IS NEEDING A PA FOR THE UBRELVY    GURMEET STATES THAT CALL CAN BE MADE TO SUBMIT VERBALLY   - [phone] -   REFERENCE CARDENAS IS BLLXBLLAMAURI

## 2021-04-28 ENCOUNTER — APPOINTMENT (OUTPATIENT)
Dept: WOMENS IMAGING | Facility: HOSPITAL | Age: 66
End: 2021-04-28

## 2021-04-28 PROCEDURE — 77063 BREAST TOMOSYNTHESIS BI: CPT | Performed by: RADIOLOGY

## 2021-04-28 PROCEDURE — 77067 SCR MAMMO BI INCL CAD: CPT | Performed by: RADIOLOGY

## 2021-07-06 DIAGNOSIS — M51.36 DDD (DEGENERATIVE DISC DISEASE), LUMBAR: ICD-10-CM

## 2021-07-06 DIAGNOSIS — M54.31 SCIATICA OF RIGHT SIDE: ICD-10-CM

## 2021-07-06 RX ORDER — GABAPENTIN 300 MG/1
300 CAPSULE ORAL 2 TIMES DAILY
Qty: 180 CAPSULE | Refills: 0 | Status: SHIPPED | OUTPATIENT
Start: 2021-07-06 | End: 2021-09-01 | Stop reason: SDUPTHER

## 2021-07-06 NOTE — TELEPHONE ENCOUNTER
Caller: Linda Cooper    Relationship: Self    Best call back number: [phone] (H)    Medication needed:   ubrogepant (UBRELVY) 100 MG tablet    When do you need the refill by: ASAP    What additional details did the patient provide when requesting the medication: PATIENT CALLED TO REQUEST THAT HER PRESCRIPTION BE SENT TO THE PHARMACY LISTED BELOW. PATIENT STATES THAT SHE HAS A 3 DAY SUPPLY LEFT.       Does the patient have less than a 3 day supply:  [] Yes  [x] No    What is the patient's preferred pharmacy:      [facility] [place] [phone] [place] [phone] FX    THANKS

## 2021-07-06 NOTE — TELEPHONE ENCOUNTER
Caller: Linda Cooper    Relationship: Self    Best call back number: [phone]    Medication needed:   Requested Prescriptions     Pending Prescriptions Disp Refills   • gabapentin (NEURONTIN) 300 MG capsule 180 capsule 0     Sig: Take 1 capsule by mouth 2 (Two) Times a Day.       When do you need the refill by: ASAP    What additional details did the patient provide when requesting the medication: PATIENT HAS A WEEKS WORTH LEFT    Does the patient have less than a 3 day supply:  [] Yes  [x] No    What is the patient's preferred pharmacy: [facility] [address] [phone] [place] [phone]

## 2021-09-01 ENCOUNTER — OFFICE VISIT (OUTPATIENT)
Dept: FAMILY MEDICINE CLINIC | Facility: CLINIC | Age: 66
End: 2021-09-01

## 2021-09-01 VITALS
WEIGHT: 155 LBS | SYSTOLIC BLOOD PRESSURE: 112 MMHG | HEART RATE: 66 BPM | DIASTOLIC BLOOD PRESSURE: 66 MMHG | RESPIRATION RATE: 16 BRPM | OXYGEN SATURATION: 97 % | BODY MASS INDEX: 22.89 KG/M2

## 2021-09-01 DIAGNOSIS — B35.3 TINEA PEDIS OF RIGHT FOOT: ICD-10-CM

## 2021-09-01 DIAGNOSIS — M51.36 DDD (DEGENERATIVE DISC DISEASE), LUMBAR: ICD-10-CM

## 2021-09-01 DIAGNOSIS — M54.31 SCIATICA OF RIGHT SIDE: ICD-10-CM

## 2021-09-01 DIAGNOSIS — B07.0 PLANTAR WART OF BOTH FEET: Primary | ICD-10-CM

## 2021-09-01 PROCEDURE — 99214 OFFICE O/P EST MOD 30 MIN: CPT | Performed by: FAMILY MEDICINE

## 2021-09-01 RX ORDER — GABAPENTIN 300 MG/1
300 CAPSULE ORAL 2 TIMES DAILY
Qty: 180 CAPSULE | Refills: 0 | Status: SHIPPED | OUTPATIENT
Start: 2021-09-01 | End: 2021-11-29 | Stop reason: SDUPTHER

## 2021-09-01 NOTE — PROGRESS NOTES
Subjective   Linda Cooper is a 66 y.o. female.   Rash (Foot)    History of Present Illness   Linda is here w/ c/o rash on her Rt foot, plantar warts and a skin lesion on Rt lower leg.  She would also like to renew her prescription for gabapentin.  She is here today to have this evaluated.  She has noticed a scaling rash on the lateral toes of her right foot.  Is been present for at least a few months.  She has not tried any over-the-counter medicine.  She has spent much of the summer in [place] and has a pond on her property and states that she often has wet or damp feet for hours at a time.  She also notes that she has more on the bottom of her feet there is 2 on the left and one on the right.  They are uncomfortable and has been there she thinks for at least a couple of years and her not getting better.  She is ready to get treatment.  She notes a new area of red mass in the shape of small lesions on her left lateral calf.  The little red areas are getting better and they are not painful and not itchy.  Linda has chronic low back pain and chronic sciatica and she has a history of spinal surgery.  She has found some real relief with gabapentin.  She feels like this medication helps her function and live a comfortable line.  She like to continue to take this medication.    The following portions of the patient's history were reviewed and updated as appropriate: allergies, current medications, past family history, past medical history, past social history, past surgical history and problem list.    Review of Systems   Constitutional: Negative for fatigue and fever.   HENT: Negative for congestion, rhinorrhea and sore throat.    Eyes: Negative for pain.   Respiratory: Negative for cough and shortness of breath.    Gastrointestinal: Negative for diarrhea and vomiting.   Musculoskeletal: Positive for back pain.   Skin: Positive for rash.   Hematological: Negative.    Psychiatric/Behavioral: Negative.        Objective    Physical Exam  Constitutional:       Appearance: Normal appearance.   HENT:      Mouth/Throat:      Mouth: Mucous membranes are moist.   Cardiovascular:      Rate and Rhythm: Normal rate.   Pulmonary:      Effort: Pulmonary effort is normal.   Musculoskeletal:         General: Normal range of motion.      Comments: On the soles of both feet, she has visible plantar warts.  2 on her left foot and one on her right.   Skin:     General: Skin is warm and dry.      Comments: Small cluster red bumps left lateral calf.  No warmth, no tenderness,  Right foot lateral 3 toes has flaking and red skin on toes in between toes.   Neurological:      General: No focal deficit present.      Mental Status: She is alert.   Psychiatric:         Mood and Affect: Mood normal.           Assessment/Plan   Problem List Items Addressed This Visit        Neuro    DDD (degenerative disc disease), lumbar    Overview     She is taking gabapentin after back surgery in 2019. This helps with discomfort.  The patient has read and signed the [facility] Controlled Substance Contract.  I will continue to see patient for regular follow up appointments.  She are well controlled on current medication.  KHOI has been reviewed by me and is updated every 3 months. The patient is aware of the potential for addiction and dependence.             Relevant Medications    gabapentin (NEURONTIN) 300 MG capsule      Other Visit Diagnoses     Plantar wart of both feet    -  Primary    Relevant Orders    Ambulatory Referral to Dermatology (Completed)    Sciatica of right side        Relevant Medications    gabapentin (NEURONTIN) 300 MG capsule    Tinea pedis of right foot      I have advised over-the-counter Lamisil cream to treat.  I encouraged her to keep her feet as dry as possible.               Return in about 6 months (around 3/1/2022) for Annual physical.   Answers for HPI/ROS submitted by the patient on 8/25/2021  What is the primary reason for your  visit?: Rash

## 2021-09-25 ENCOUNTER — APPOINTMENT (OUTPATIENT)
Dept: VACCINE CLINIC | Facility: HOSPITAL | Age: 66
End: 2021-09-25

## 2021-11-29 DIAGNOSIS — M51.36 DDD (DEGENERATIVE DISC DISEASE), LUMBAR: ICD-10-CM

## 2021-11-29 DIAGNOSIS — M54.31 SCIATICA OF RIGHT SIDE: ICD-10-CM

## 2021-11-30 RX ORDER — GABAPENTIN 300 MG/1
300 CAPSULE ORAL 2 TIMES DAILY
Qty: 180 CAPSULE | Refills: 0 | Status: SHIPPED | OUTPATIENT
Start: 2021-11-30 | End: 2022-01-03

## 2021-12-31 DIAGNOSIS — M51.36 DDD (DEGENERATIVE DISC DISEASE), LUMBAR: ICD-10-CM

## 2021-12-31 DIAGNOSIS — M54.31 SCIATICA OF RIGHT SIDE: ICD-10-CM

## 2022-01-03 RX ORDER — GABAPENTIN 300 MG/1
CAPSULE ORAL
Qty: 180 CAPSULE | Refills: 0 | Status: SHIPPED | OUTPATIENT
Start: 2022-01-03 | End: 2022-03-25 | Stop reason: SDUPTHER

## 2022-03-25 ENCOUNTER — OFFICE VISIT (OUTPATIENT)
Dept: FAMILY MEDICINE CLINIC | Facility: CLINIC | Age: 67
End: 2022-03-25

## 2022-03-25 VITALS
BODY MASS INDEX: 23.04 KG/M2 | HEART RATE: 60 BPM | DIASTOLIC BLOOD PRESSURE: 64 MMHG | SYSTOLIC BLOOD PRESSURE: 114 MMHG | WEIGHT: 156 LBS | RESPIRATION RATE: 16 BRPM | OXYGEN SATURATION: 95 %

## 2022-03-25 DIAGNOSIS — G43.109 MIGRAINE WITH AURA AND WITHOUT STATUS MIGRAINOSUS, NOT INTRACTABLE: ICD-10-CM

## 2022-03-25 DIAGNOSIS — N95.1 MENOPAUSAL SYMPTOMS: ICD-10-CM

## 2022-03-25 DIAGNOSIS — Z12.39 ENCOUNTER FOR OTHER SCREENING FOR MALIGNANT NEOPLASM OF BREAST: ICD-10-CM

## 2022-03-25 DIAGNOSIS — Z00.00 ROUTINE GENERAL MEDICAL EXAMINATION AT A HEALTH CARE FACILITY: Primary | ICD-10-CM

## 2022-03-25 DIAGNOSIS — M54.50 CHRONIC MIDLINE LOW BACK PAIN WITHOUT SCIATICA: ICD-10-CM

## 2022-03-25 DIAGNOSIS — G89.29 CHRONIC MIDLINE LOW BACK PAIN WITHOUT SCIATICA: ICD-10-CM

## 2022-03-25 DIAGNOSIS — M85.88 OSTEOPENIA OF LUMBAR SPINE: ICD-10-CM

## 2022-03-25 DIAGNOSIS — M54.31 SCIATICA OF RIGHT SIDE: ICD-10-CM

## 2022-03-25 DIAGNOSIS — M51.36 DDD (DEGENERATIVE DISC DISEASE), LUMBAR: ICD-10-CM

## 2022-03-25 PROCEDURE — 99397 PER PM REEVAL EST PAT 65+ YR: CPT | Performed by: FAMILY MEDICINE

## 2022-03-25 PROCEDURE — 99214 OFFICE O/P EST MOD 30 MIN: CPT | Performed by: FAMILY MEDICINE

## 2022-03-25 RX ORDER — GABAPENTIN 300 MG/1
300 CAPSULE ORAL 2 TIMES DAILY
Qty: 180 CAPSULE | Refills: 3 | Status: SHIPPED | OUTPATIENT
Start: 2022-03-25 | End: 2022-07-18 | Stop reason: SDUPTHER

## 2022-03-25 NOTE — PATIENT INSTRUCTIONS
Annual Wellness  Personal Prevention Plan of Service     Date of Office Visit:    Encounter Provider:  Billy Poole MD  Place of Service:  [facility] PRIMARY [facility]ARE  Patient Name: Linda Cooper  :  1955    As part of the Annual  Wellness portion of your visit today, we are providing you with this personalized preventive plan of services (PPPS). This plan is based upon recommendations of the [place] Preventive Services Task Force (USPSTF) and the Advisory Committee on Immunization Practices (ACIP).    This lists the preventive care services that should be considered, and provides dates of when you are due. Items listed as completed are up-to-date and do not require any further intervention.    Health Maintenance   Topic Date Due    MAMMOGRAM  Never done    DXA SCAN  2022    INFLUENZA VACCINE  2023 (Originally 2021)    Pneumococcal Vaccine 65+ (2 of 2 - PPSV23) 2023 (Originally 2021)    TDAP/TD VACCINES (1 - Tdap) 2023 (Originally 1975)    ANNUAL PHYSICAL  2023    COLORECTAL CANCER SCREENING  10/28/2023    HEPATITIS C SCREENING  Completed    COVID-19 Vaccine  Completed    ZOSTER VACCINE  Completed    PAP SMEAR  Discontinued       Orders Placed This Encounter   Procedures    Mammo screening digital tomosynthesis bilateral w CAD     Standing Status:   Future     Standing Expiration Date:   3/25/2023     Order Specific Question:   Reason for Exam:     Answer:   yearly screening     Order Specific Question:   Does this patient have a diabetic monitoring/medication delivering device on?     Answer:   No     Order Specific Question:   Release to patient     Answer:   Immediate    DEXA Bone Density Axial     Standing Status:   Future     Standing Expiration Date:   3/25/2023     Order Specific Question:   Is patient taking or have taken long term Glucocorticoid (steroids)?     Answer:   No     Order Specific Question:   Does the patient have  rheumatoid arthritis?     Answer:   No     Order Specific Question:   Does the patient have secondary osteoporosis?     Answer:   No     Order Specific Question:   Reason for Exam:     Answer:   post menopausal     Order Specific Question:   Release to patient     Answer:   Immediate    Comprehensive Metabolic Panel     Order Specific Question:   Release to patient     Answer:   Immediate    CBC (No Diff)     Order Specific Question:   Release to patient     Answer:   Immediate    Lipid Panel With LDL / HDL Ratio     Order Specific Question:   Release to patient     Answer:   Immediate       Return in about 6 months (around 9/25/2022).

## 2022-03-25 NOTE — PROGRESS NOTES
Preventive Exam    History of Present Illness: Linda is here for check up and review of routine health.  We also addressed the following chronic conditions:    Migraine headaches. This is a chronic problem and she has been taking Maxalt as needed.  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 She has been using a Climara patch and that helps with migraine.   She is an occasional smoker.    She has chronic back pain and has been cared for by surgery in the past. She now manages chronic pain issues with gabapentin. The current dose is working well for herh maintenance. She states she is doing well.    Pap Smear:s/p total hys  Mammogram:ordered  Colon cancer screening:UTD  Tobacco use :slow volume smoker  Bone Density:ordered      REVIEW OF SYSTEMS  Constitutional: Negative.    HENT: Negative.    Eyes: Negative.    Respiratory: Negative.    Cardiovascular: Negative.    Gastrointestinal: Negative.    Endocrine: Negative.    Genitourinary: Negative.    Musculoskeletal: Negative.  Skin: Negative.    Allergic/Immunologic: Negative.    Neurological: Migraine headache and back pain  Hematological: Negative.    Psychiatric/Behavioral: Negative.    I have reviewed the ROS as documented by the MA. Billy Poole MD       PHYSICAL EXAM    Vitals:    03/25/22 1007   BP: 114/64   Pulse: 60   Resp: 16   SpO2: 95%   Weight: 70.8 kg (156 lb)     GENERAL: alert and oriented, afebrile and vital signs stable  HEENT: oral mucosa moist, PEERLA, EOM, conjunctiva normal  No cervical adenopathy  LUNGS: clear to ascultation bilaterally, no rales, ronchi or wheezing  HEART: RRR S1 S2 without murmers, thrills, rubs or gallops  CHEST WALL: within normal limits, no tenderness  ABDOMEN: WNL. Normal BS.  EXTREMITIES: No clubbing, cyanosis or edema noted. Normal Pulses.  SKIN: warm, dry, no rashes noted  NEURO: CN II- XII grossly intact  PSYCH: Good mood and positive affect  ASSESSMENT AND PLAN  Problem List Items  Addressed This Visit        Genitourinary and Reproductive     Menopausal symptoms    Overview     Followed by GYN and on Climera patch.              Musculoskeletal and Injuries    Chronic midline low back pain without sciatica    Overview     She has chronic pain and she is taking gabapentin and doing well.The patient has read and signed the [facility] Controlled Substance Contract.  I will continue to see patient for regular follow up appointments.  She are well controlled on current medication.  KHOI has been reviewed by me and is updated every 3 months. The patient is aware of the potential for addiction and dependence.               Osteopenia of lumbar spine    Relevant Orders    DEXA Bone Density Axial       Neuro    Migraine with aura    Overview     This is a chronic problem that responds well to Maxalt, Ubrelvy and Climera patch.             Relevant Medications    gabapentin (NEURONTIN) 300 MG capsule    ubrogepant (UBRELVY) 100 MG tablet    DDD (degenerative disc disease), lumbar    Overview     She is taking gabapentin after back surgery in 2019. This helps with discomfort.  The patient has read and signed the [facility] Controlled Substance Contract.  I will continue to see patient for regular follow up appointments.  She are well controlled on current medication.  KHOI has been reviewed by me and is updated every 3 months. The patient is aware of the potential for addiction and dependence.               Relevant Medications    gabapentin (NEURONTIN) 300 MG capsule      Other Visit Diagnoses     Routine general medical examination at a health care facility    -  Primary    Relevant Orders    Comprehensive Metabolic Panel    CBC (No Diff)    Lipid Panel With LDL / HDL Ratio    Encounter for other screening for malignant neoplasm of breast        Relevant Orders    Mammo screening digital tomosynthesis bilateral w CAD    Sciatica of right side        Relevant Medications    gabapentin  (NEURONTIN) 300 MG capsule        Routine health maintenance reviewed and discussed with Linda.  The patient was counseled regarding a healthy diet and exercise, appropriate routine screening and immunizations and encouraged to get regular dental and eye exams .      Return in about 6 months (around 9/25/2022) for Recheck.

## 2022-04-08 ENCOUNTER — TELEPHONE (OUTPATIENT)
Dept: FAMILY MEDICINE CLINIC | Facility: CLINIC | Age: 67
End: 2022-04-08

## 2022-04-08 NOTE — TELEPHONE ENCOUNTER
Caller: Linda Cooper    Relationship: Self    Best call back number: [phone]    What specialty or service is being requested: BONE DENSITY AND MAMMOGRAM    What is the provider, practice or medical service name: [facility]    What is the office location: [address] [place] [address]    What is the office phone number: [phone]    Any additional details: PATIENT STATES AT HER LAST APPOINTMENT SHE REQUESTED THAT HER REFERRAL BE SENT TO THE [facility] INSTEAD OF [facility] FOR HER MAMMOGRAM AND BONE DENSITY SCAN.     PLEASE CALL PATIENT BACK WHEN THE REFERRAL IS SENT TO [facility].

## 2022-05-02 RX ORDER — ESTRADIOL 0.1 MG/D
1 PATCH TRANSDERMAL WEEKLY
Status: CANCELLED | OUTPATIENT
Start: 2022-05-02

## 2022-05-02 NOTE — TELEPHONE ENCOUNTER
Caller: Linda Cooper    Relationship: Self    Best call back number: [phone]    Requested Prescriptions:   Requested Prescriptions     Pending Prescriptions Disp Refills   • Climara 0.1 MG/24HR patch       Sig: Place 1 patch on the skin as directed by provider 1 (One) Time Per Week.        Pharmacy where request should be sent: [facility] [address] [phone] [facility] [phone] FX     Additional details provided by patient: PATIENT HAS 3 LEFT    Does the patient have less than a 3 day supply:  [x] Yes  [] No    Kit Alonso Rep   05/02/22 14:47 EDT

## 2022-05-03 RX ORDER — ESTRADIOL 0.1 MG/D
1 FILM, EXTENDED RELEASE TRANSDERMAL WEEKLY
Qty: 12 PATCH | Refills: 3 | Status: SHIPPED | OUTPATIENT
Start: 2022-05-03 | End: 2022-05-10 | Stop reason: SDUPTHER

## 2022-05-10 RX ORDER — ESTRADIOL 0.1 MG/D
1 FILM, EXTENDED RELEASE TRANSDERMAL WEEKLY
Qty: 2 PATCH | Refills: 0 | Status: SHIPPED | OUTPATIENT
Start: 2022-05-10 | End: 2022-05-10 | Stop reason: SDUPTHER

## 2022-05-10 RX ORDER — ESTRADIOL 0.1 MG/D
1 FILM, EXTENDED RELEASE TRANSDERMAL WEEKLY
Qty: 8 PATCH | Refills: 5 | Status: SHIPPED | OUTPATIENT
Start: 2022-05-10 | End: 2022-05-17 | Stop reason: SDUPTHER

## 2022-05-12 ENCOUNTER — APPOINTMENT (OUTPATIENT)
Dept: WOMENS IMAGING | Facility: HOSPITAL | Age: 67
End: 2022-05-12

## 2022-05-12 PROCEDURE — 77067 SCR MAMMO BI INCL CAD: CPT | Performed by: RADIOLOGY

## 2022-05-12 PROCEDURE — 77063 BREAST TOMOSYNTHESIS BI: CPT | Performed by: RADIOLOGY

## 2022-05-17 ENCOUNTER — TELEPHONE (OUTPATIENT)
Dept: FAMILY MEDICINE CLINIC | Facility: CLINIC | Age: 67
End: 2022-05-17

## 2022-05-17 RX ORDER — ESTRADIOL 0.1 MG/D
1 FILM, EXTENDED RELEASE TRANSDERMAL WEEKLY
Qty: 4 PATCH | Refills: 0 | Status: SHIPPED | OUTPATIENT
Start: 2022-05-17 | End: 2022-06-14

## 2022-05-17 NOTE — TELEPHONE ENCOUNTER
Pharmacy Name:  [facility]    Pharmacy representative name: SAMEER    Pharmacy representative phone number: [phone]     What medication are you calling in regards to: estradiol (VIVELLE-DOT) 0.1 MG/24HR patch  DR FAB HERNANDEZ    What question does the pharmacy have: PATIENT HAS A PRESCRIPTION FOR THIS MEDICATION ONLY AT .0375 TWICE A WEEK..  PHARMACY WOULD LIKE TO KNOW WHICH ONE PATIENT IS SUPPOSE TO HAVE.   REF# [id number]    Who is the provider that prescribed the medication: 0.375 DR DAVID GLOVER    Additional notes: PHARMACY IS NEEDING TO KNOW WHICH PRESCRIPTION TO USE,     estradiol (VIVELLE-DOT) 0.1 MG/24HR patch  IS A 1 PATCH WEEKLY PRESCRIPTION

## 2022-05-21 LAB
ALBUMIN SERPL-MCNC: 4.2 G/DL (ref 3.8–4.8)
ALBUMIN/GLOB SERPL: 1.6 {RATIO} (ref 1.2–2.2)
ALP SERPL-CCNC: 68 IU/L (ref 44–121)
ALT SERPL-CCNC: 13 IU/L (ref 0–32)
AST SERPL-CCNC: 23 IU/L (ref 0–40)
BILIRUB SERPL-MCNC: 0.3 MG/DL (ref 0–1.2)
BUN SERPL-MCNC: 11 MG/DL (ref 8–27)
BUN/CREAT SERPL: 16 (ref 12–28)
CALCIUM SERPL-MCNC: 9.3 MG/DL (ref 8.7–10.3)
CHLORIDE SERPL-SCNC: 101 MMOL/L (ref 96–106)
CHOLEST SERPL-MCNC: 197 MG/DL (ref 100–199)
CO2 SERPL-SCNC: 23 MMOL/L (ref 20–29)
CREAT SERPL-MCNC: 0.7 MG/DL (ref 0.57–1)
EGFRCR SERPLBLD CKD-EPI 2021: 96 ML/MIN/1.73
ERYTHROCYTE [DISTWIDTH] IN BLOOD BY AUTOMATED COUNT: 13.1 % (ref 11.7–15.4)
GLOBULIN SER CALC-MCNC: 2.7 G/DL (ref 1.5–4.5)
GLUCOSE SERPL-MCNC: 82 MG/DL (ref 65–99)
HCT VFR BLD AUTO: 40.2 % (ref 34–46.6)
HDLC SERPL-MCNC: 60 MG/DL
HGB BLD-MCNC: 12.7 G/DL (ref 11.1–15.9)
LDLC SERPL CALC-MCNC: 123 MG/DL (ref 0–99)
LDLC/HDLC SERPL: 2.1 RATIO (ref 0–3.2)
MCH RBC QN AUTO: 28.7 PG (ref 26.6–33)
MCHC RBC AUTO-ENTMCNC: 31.6 G/DL (ref 31.5–35.7)
MCV RBC AUTO: 91 FL (ref 79–97)
PLATELET # BLD AUTO: 277 X10E3/UL (ref 150–450)
POTASSIUM SERPL-SCNC: 4.6 MMOL/L (ref 3.5–5.2)
PROT SERPL-MCNC: 6.9 G/DL (ref 6–8.5)
RBC # BLD AUTO: 4.43 X10E6/UL (ref 3.77–5.28)
SODIUM SERPL-SCNC: 137 MMOL/L (ref 134–144)
TRIGL SERPL-MCNC: 77 MG/DL (ref 0–149)
VLDLC SERPL CALC-MCNC: 14 MG/DL (ref 5–40)
WBC # BLD AUTO: 4.6 X10E3/UL (ref 3.4–10.8)

## 2022-07-14 ENCOUNTER — TELEPHONE (OUTPATIENT)
Dept: NEUROSURGERY | Facility: CLINIC | Age: 67
End: 2022-07-14

## 2022-07-14 DIAGNOSIS — M54.50 CHRONIC MIDLINE LOW BACK PAIN WITHOUT SCIATICA: ICD-10-CM

## 2022-07-14 DIAGNOSIS — G89.29 CHRONIC MIDLINE LOW BACK PAIN WITHOUT SCIATICA: ICD-10-CM

## 2022-07-14 DIAGNOSIS — M51.36 DDD (DEGENERATIVE DISC DISEASE), LUMBAR: Primary | ICD-10-CM

## 2022-07-14 NOTE — TELEPHONE ENCOUNTER
Pt called: She is having (R) leg pain w/numbness in her foot (states always had the numbness) while walking. States this is making her very nervous. She states she has some pain when sitting in her (R) hip. She is currently taking about 3 naproxen daily. No current imaging and denies B/B issues. Has not tried heat or ice. She said she is currently  out of the state until the middle of Aug.  Please advise! Thanks!      Last seen: 12/21/20 w/Marquise  Office Visit with Edson Camarillo MD (12/21/2020)        Pt contact: [phone]

## 2022-07-18 ENCOUNTER — TELEPHONE (OUTPATIENT)
Dept: NEUROSURGERY | Facility: CLINIC | Age: 67
End: 2022-07-18

## 2022-07-18 DIAGNOSIS — M54.31 SCIATICA OF RIGHT SIDE: ICD-10-CM

## 2022-07-18 DIAGNOSIS — M51.36 DDD (DEGENERATIVE DISC DISEASE), LUMBAR: ICD-10-CM

## 2022-07-18 RX ORDER — GABAPENTIN 300 MG/1
300 CAPSULE ORAL 2 TIMES DAILY
Qty: 180 CAPSULE | Refills: 3 | Status: SHIPPED | OUTPATIENT
Start: 2022-07-18 | End: 2022-10-24

## 2022-07-18 RX ORDER — METHYLPREDNISOLONE 4 MG/1
TABLET ORAL
Qty: 21 TABLET | Refills: 0 | Status: SHIPPED | OUTPATIENT
Start: 2022-07-18 | End: 2022-10-17

## 2022-07-18 NOTE — TELEPHONE ENCOUNTER
Spoke to Ms. Cooper  She uses [facility] # [id number] [facility] ph: [phone]. Also if she needs an MRI she would to have it there in [place] instead of [facility]. She stated Covid is low there. Would prefer to have there her insurance will pay per Ms. Cooper.She is aware that we must have  a disc if they do imaging outside of [facility].

## 2022-07-18 NOTE — TELEPHONE ENCOUNTER
"Per Dr. Camarillo, \"Give her a Medrol Dosepak and give her a follow-up visit when she returns from being out of state.  It can be with either me or an APC.  She may need an MRI at that point.\"    I called and LVM for the patient advising that Dr. Camarillo would like for her to get an MDP but I do need a good pharmacy to send this to as she is out of town. Asked that she call back to provide this information.   "

## 2022-07-18 NOTE — TELEPHONE ENCOUNTER
Caller: Linda Cooper    Relationship: Self    Best call back number:[phone]    Requested Prescriptions:   Requested Prescriptions     Pending Prescriptions Disp Refills   • gabapentin (NEURONTIN) 300 MG capsule 180 capsule 3     Sig: Take 1 capsule by mouth 2 (Two) Times a Day.        Pharmacy where request should be sent: [id number] - [place], [place] - [address] - [phone] PH - [phone] FX     Additional details provided by patient: PATIENT IS LEAVING TOWN IN THE AM AND NEEDS THIS REFILLED BEFORE THEN    Does the patient have less than a 3 day supply:  [x] Yes  [] No    Kit Isaac Rep   07/18/22 09:59 EDT

## 2022-07-18 NOTE — TELEPHONE ENCOUNTER
Called and advised that MDP has been sent to pharmacy. I advised that Dr. Camarillo would like her to be seen in the office after she is back before ordering an MRI. She voiced understanding.

## 2022-07-18 NOTE — TELEPHONE ENCOUNTER
PT CALLED TO CHECK STATUS OF THIS RX.  PT STATES SHE WOULD LIKE TO PICK IT UP BY 10:00 AM 07/19/22 DUE TO HER TRAVELING AND SHE STATES THEY CLOSE TODAY AT 6PM    PLEASE CALL PT   THANK YOU

## 2022-09-29 ENCOUNTER — OFFICE VISIT (OUTPATIENT)
Dept: FAMILY MEDICINE CLINIC | Facility: CLINIC | Age: 67
End: 2022-09-29

## 2022-09-29 VITALS
HEIGHT: 68 IN | SYSTOLIC BLOOD PRESSURE: 114 MMHG | DIASTOLIC BLOOD PRESSURE: 74 MMHG | BODY MASS INDEX: 23.19 KG/M2 | RESPIRATION RATE: 16 BRPM | WEIGHT: 153 LBS | OXYGEN SATURATION: 100 % | HEART RATE: 68 BPM

## 2022-09-29 DIAGNOSIS — Z23 NEED FOR VACCINATION: ICD-10-CM

## 2022-09-29 DIAGNOSIS — M54.50 CHRONIC MIDLINE LOW BACK PAIN WITHOUT SCIATICA: Primary | ICD-10-CM

## 2022-09-29 DIAGNOSIS — G89.29 CHRONIC MIDLINE LOW BACK PAIN WITHOUT SCIATICA: Primary | ICD-10-CM

## 2022-09-29 DIAGNOSIS — G43.109 MIGRAINE WITH AURA AND WITHOUT STATUS MIGRAINOSUS, NOT INTRACTABLE: ICD-10-CM

## 2022-09-29 PROCEDURE — 99214 OFFICE O/P EST MOD 30 MIN: CPT | Performed by: FAMILY MEDICINE

## 2022-09-29 PROCEDURE — 90662 IIV NO PRSV INCREASED AG IM: CPT | Performed by: FAMILY MEDICINE

## 2022-09-29 PROCEDURE — 90471 IMMUNIZATION ADMIN: CPT | Performed by: FAMILY MEDICINE

## 2022-09-29 NOTE — PROGRESS NOTES
Subjective   Linda Cooper is a 67 y.o. female.   No chief complaint on file.    History of Present Illness   Linda is here for 6 mo follow up.  She states she is doing well.  Her back is stable for now.  She states she has not had a migraine in 4 mos.    Migraine headaches. This is a chronic problem and she has been taking Maxalt and Ubrelvy as needed.  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 She has been using a Climara patch and that helps with migraine.   She is an occasional smoker.     She has chronic back pain and has been cared for by surgery in the past. She now manages chronic pain issues with gabapentin. The current dose is working well for herh maintenance. She states she is doing well.       The following portions of the patient's history were reviewed and updated as appropriate: allergies, current medications, past family history, past medical history, past social history, past surgical history and problem list.    Review of Systems   Constitutional: Negative for fatigue and fever.   HENT: Negative for congestion, rhinorrhea and sore throat.    Eyes: Negative for pain.   Respiratory: Negative for cough and shortness of breath.    Gastrointestinal: Negative for diarrhea and vomiting.   Musculoskeletal: Positive for back pain.   Hematological: Negative.    Psychiatric/Behavioral: Negative.        Objective   Physical Exam  Vitals and nursing note reviewed.   Constitutional:       Appearance: She is well-developed.   HENT:      Head: Normocephalic and atraumatic.   Eyes:      Pupils: Pupils are equal, round, and reactive to light.   Cardiovascular:      Rate and Rhythm: Normal rate and regular rhythm.      Heart sounds: Normal heart sounds.   Pulmonary:      Effort: Pulmonary effort is normal.      Breath sounds: Normal breath sounds.   Skin:     General: Skin is warm and dry.   Neurological:      Mental Status: She is alert and oriented to person, place, and time.    Psychiatric:         Mood and Affect: Mood normal.         Behavior: Behavior normal.         Thought Content: Thought content normal.           Assessment & Plan   Problem List Items Addressed This Visit        Musculoskeletal and Injuries    Chronic midline low back pain without sciatica - Primary    Overview     She has chronic pain and she is taking gabapentin and doing well.The patient has read and signed the [facility] Controlled Substance Contract.  I will continue to see patient for regular follow up appointments.  She are well controlled on current medication.  KHOI has been reviewed by me and is updated every 3 months. The patient is aware of the potential for addiction and dependence.                Neuro    Migraine with aura    Overview     This is a chronic problem that responds well to Maxalt and Ubrelvey.            Other Visit Diagnoses     Need for vaccination        Relevant Orders    Fluzone High-Dose 65+yrs (0431-0095)               Return in about 6 months (around 3/29/2023) for Annual physical.   Answers for HPI/ROS submitted by the patient on 9/27/2022  What is the primary reason for your visit?: Physical

## 2022-10-12 NOTE — PROGRESS NOTES
Subjective   Patient ID: Linda Cooper is a 67 y.o. female is here today for follow-up with Lumbar MRI.    Today, Ms. Cooper reports intermittent back pain. She denies back pain today. She said that the pain was intense during the summer. She reports intermittent bilateral leg pain. She also reports numbness and tingling; left greater than right leg. She states that his issue is not debilitating. She reports of some bilateral groin pain. She said that a lot of this just depends on her activity as well.     This very nice lady is a  at the [employer].  She is have 3 surgeries, a discectomy in the 1990s.  A decompression by me at L4-L5 and L5 5 S1 in 2012 and then at L2-L3 and L3-L4 discectomy and decompression about 3 to 4 years ago.  She is done reasonably well continues to be active.  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  She had a flareup of her right anterior thigh pain about 3 months ago that was quite severe for several weeks and has since settled down.  She was concerned about some symptoms on the left side which traditionally has not really bothered her that much.  Such as numbness and tingling in the left foot and calf.  I told her all of these things were from the recurrent stenosis from L2-S1.  Surprisingly despite her scoliosis she does not really have a whole lot of back pain.  She takes gabapentin.  She is back to her baseline state.  I told her that if this flares up again we could try some transforaminal blocks on the right at L2-L3 and L3-L4.  She has had these before and they helped somewhat.  But hopefully we can avoid an instrumented front and back fusion which is what it would take to correct her deformity if it came down to it.  But her symptoms do not justify that right now.        History of Present Illness    The following portions of the patient's history were reviewed and updated as appropriate: allergies, current medications, past  "family history, past medical history, past social history, past surgical history and problem list.    Review of Systems   Constitutional: Negative for fever.   Musculoskeletal: Positive for back pain.   Neurological: Positive for numbness.   All other systems reviewed and are negative.          Objective     Vitals:    10/17/22 0930   BP: 110/70   Pulse: (!) 45   Temp: 97.1 °F (36.2 °C)   SpO2: 100%   Weight: 69.4 kg (153 lb)   Height: 172.7 cm (68\")     Body mass index is 23.26 kg/m².      Physical Exam  Constitutional:       Appearance: She is well-developed.   HENT:      Head: Normocephalic and atraumatic.   Eyes:      Extraocular Movements: EOM normal.      Conjunctiva/sclera: Conjunctivae normal.      Pupils: Pupils are equal, round, and reactive to light.   Neck:      Vascular: No carotid bruit.   Neurological:      Mental Status: She is oriented to person, place, and time.      Coordination: Finger-Nose-Finger Test and Heel to Shin Test normal.      Gait: Gait is intact.      Deep Tendon Reflexes:      Reflex Scores:       Tricep reflexes are 2+ on the right side and 2+ on the left side.       Bicep reflexes are 2+ on the right side and 2+ on the left side.       Brachioradialis reflexes are 2+ on the right side and 2+ on the left side.       Patellar reflexes are 2+ on the right side and 2+ on the left side.       Achilles reflexes are 2+ on the right side and 2+ on the left side.  Psychiatric:         Speech: Speech normal.       Neurologic Exam     Mental Status   Oriented to person, place, and time.   Registration of memory: Good recent and remote memory.   Attention: normal. Concentration: normal.   Speech: speech is normal   Level of consciousness: alert  Knowledge: consistent with education.     Cranial Nerves     CN II   Visual fields full to confrontation.   Visual acuity: normal    CN III, IV, VI   Pupils are equal, round, and reactive to light.  Extraocular motions are normal.     CN V   Facial " sensation intact.   Right corneal reflex: normal  Left corneal reflex: normal    CN VII   Facial expression full, symmetric.   Right facial weakness: none  Left facial weakness: none    CN VIII   Hearing: intact    CN IX, X   Palate: symmetric    CN XI   Right sternocleidomastoid strength: normal  Left sternocleidomastoid strength: normal    CN XII   Tongue: not atrophic  Tongue deviation: none    Motor Exam   Muscle bulk: normal  Right arm tone: normal  Left arm tone: normal  Right leg tone: normal  Left leg tone: normal    Strength   Strength 5/5 except as noted.     Sensory Exam   Light touch normal.     Gait, Coordination, and Reflexes     Gait  Gait: normal    Coordination   Finger to nose coordination: normal  Heel to shin coordination: normal    Reflexes   Right brachioradialis: 2+  Left brachioradialis: 2+  Right biceps: 2+  Left biceps: 2+  Right triceps: 2+  Left triceps: 2+  Right patellar: 2+  Left patellar: 2+  Right achilles: 2+  Left achilles: 2+  Right : 2+  Left : 2+          Assessment & Plan   Independent Review of Radiographic Studies:      I personally reviewed the images from the following studies.    I reviewed the lumbar MRI done on 8/9/2022 in [place] which does show recurrent spinal stenosis with severe right foraminal stenosis at L2-L3 and L3-L4.  There are postoperative changes from L2-S1.  There is some recurrent stenosis bilaterally at L4 and L5 and L5 and S1 with a spondylolisthesis at multiple levels and a rather pronounced scoliosis in the lumbar spine.  Agree with the report.        Medical Decision Making:      I will see her in 6 months.  She is back at a reasonably comfortable baseline.  She will continue her exercises including Pilates.  I encouraged her to try some elliptical work or bicycling as an alternative to her walking.  If there is a severe flareup in the right leg pain we can try transforaminal block at L2-L3 on the right at or L3-L4.      Diagnoses and all  orders for this visit:    1. History of spinal surgery (Primary)    2. Chronic midline low back pain without sciatica    3. Spinal stenosis of lumbar region with neurogenic claudication    4. Dextroscoliosis of lumbar spine    5. Spondylolisthesis of lumbar region      Return in about 6 months (around 4/17/2023) for face to face.

## 2022-10-17 ENCOUNTER — OFFICE VISIT (OUTPATIENT)
Dept: NEUROSURGERY | Facility: CLINIC | Age: 67
End: 2022-10-17

## 2022-10-17 VITALS
WEIGHT: 153 LBS | HEART RATE: 45 BPM | DIASTOLIC BLOOD PRESSURE: 70 MMHG | SYSTOLIC BLOOD PRESSURE: 110 MMHG | TEMPERATURE: 97.1 F | OXYGEN SATURATION: 100 % | HEIGHT: 68 IN | BODY MASS INDEX: 23.19 KG/M2

## 2022-10-17 DIAGNOSIS — G89.29 CHRONIC MIDLINE LOW BACK PAIN WITHOUT SCIATICA: ICD-10-CM

## 2022-10-17 DIAGNOSIS — M48.062 SPINAL STENOSIS OF LUMBAR REGION WITH NEUROGENIC CLAUDICATION: ICD-10-CM

## 2022-10-17 DIAGNOSIS — M43.16 SPONDYLOLISTHESIS OF LUMBAR REGION: ICD-10-CM

## 2022-10-17 DIAGNOSIS — Z98.890 HISTORY OF SPINAL SURGERY: Primary | ICD-10-CM

## 2022-10-17 DIAGNOSIS — M54.50 CHRONIC MIDLINE LOW BACK PAIN WITHOUT SCIATICA: ICD-10-CM

## 2022-10-17 DIAGNOSIS — M41.86 DEXTROSCOLIOSIS OF LUMBAR SPINE: ICD-10-CM

## 2022-10-17 PROCEDURE — 99214 OFFICE O/P EST MOD 30 MIN: CPT | Performed by: NEUROLOGICAL SURGERY

## 2022-10-22 DIAGNOSIS — M54.31 SCIATICA OF RIGHT SIDE: ICD-10-CM

## 2022-10-22 DIAGNOSIS — M51.36 DDD (DEGENERATIVE DISC DISEASE), LUMBAR: ICD-10-CM

## 2022-10-24 DIAGNOSIS — M51.36 DDD (DEGENERATIVE DISC DISEASE), LUMBAR: ICD-10-CM

## 2022-10-24 DIAGNOSIS — M54.31 SCIATICA OF RIGHT SIDE: ICD-10-CM

## 2022-10-24 RX ORDER — GABAPENTIN 300 MG/1
CAPSULE ORAL
Qty: 180 CAPSULE | Refills: 1 | Status: SHIPPED | OUTPATIENT
Start: 2022-10-24 | End: 2022-10-24 | Stop reason: SDUPTHER

## 2022-10-24 NOTE — TELEPHONE ENCOUNTER
Caller: Linda Cooper    Relationship: Self    Best call back number: [phone]    Requested Prescriptions:   Requested Prescriptions     Pending Prescriptions Disp Refills   • gabapentin (NEURONTIN) 300 MG capsule 180 capsule 1     Sig: Take 1 capsule by mouth 2 (Two) Times a Day.        Pharmacy where request should be sent: [id number] - [place], [place] - [address] IN THE [facility] [phone] [facility] [phone]      Additional details provided by patient:STATED SHE WAS SEEN 10/17/22  Does the patient have less than a 3 day supply:  [x] Yes  [] No    Marilee Martinez   10/24/22 13:18 EDT

## 2022-10-25 RX ORDER — GABAPENTIN 300 MG/1
300 CAPSULE ORAL 2 TIMES DAILY
Qty: 180 CAPSULE | Refills: 1 | Status: SHIPPED | OUTPATIENT
Start: 2022-10-25 | End: 2023-01-17 | Stop reason: SDUPTHER

## 2022-11-02 DIAGNOSIS — G43.109 MIGRAINE WITH AURA AND WITHOUT STATUS MIGRAINOSUS, NOT INTRACTABLE: ICD-10-CM

## 2023-01-17 DIAGNOSIS — M51.36 DDD (DEGENERATIVE DISC DISEASE), LUMBAR: ICD-10-CM

## 2023-01-17 DIAGNOSIS — M54.31 SCIATICA OF RIGHT SIDE: ICD-10-CM

## 2023-01-18 RX ORDER — GABAPENTIN 300 MG/1
300 CAPSULE ORAL 2 TIMES DAILY
Qty: 180 CAPSULE | Refills: 1 | Status: SHIPPED | OUTPATIENT
Start: 2023-01-18

## 2023-04-13 ENCOUNTER — OFFICE VISIT (OUTPATIENT)
Dept: FAMILY MEDICINE CLINIC | Facility: CLINIC | Age: 68
End: 2023-04-13
Payer: COMMERCIAL

## 2023-04-13 VITALS
RESPIRATION RATE: 16 BRPM | OXYGEN SATURATION: 95 % | BODY MASS INDEX: 23.34 KG/M2 | HEIGHT: 68 IN | SYSTOLIC BLOOD PRESSURE: 100 MMHG | DIASTOLIC BLOOD PRESSURE: 60 MMHG | HEART RATE: 62 BPM | WEIGHT: 154 LBS

## 2023-04-13 DIAGNOSIS — G43.109 MIGRAINE WITH AURA AND WITHOUT STATUS MIGRAINOSUS, NOT INTRACTABLE: ICD-10-CM

## 2023-04-13 DIAGNOSIS — Z12.31 ENCOUNTER FOR SCREENING MAMMOGRAM FOR MALIGNANT NEOPLASM OF BREAST: ICD-10-CM

## 2023-04-13 DIAGNOSIS — G89.29 CHRONIC MIDLINE LOW BACK PAIN WITHOUT SCIATICA: ICD-10-CM

## 2023-04-13 DIAGNOSIS — R20.0 NUMBNESS OF LEFT HAND: ICD-10-CM

## 2023-04-13 DIAGNOSIS — Z23 NEED FOR VACCINATION: ICD-10-CM

## 2023-04-13 DIAGNOSIS — Z00.00 ROUTINE GENERAL MEDICAL EXAMINATION AT A HEALTH CARE FACILITY: Primary | ICD-10-CM

## 2023-04-13 DIAGNOSIS — Z78.0 POST-MENOPAUSAL: ICD-10-CM

## 2023-04-13 DIAGNOSIS — M54.50 CHRONIC MIDLINE LOW BACK PAIN WITHOUT SCIATICA: ICD-10-CM

## 2023-04-13 NOTE — PROGRESS NOTES
"Preventive Exam    History of Present Illness: Linda is here for check up and review of routine health maintenance. She states she is doing well and has no concerns.    Linda notes she has a new problem of left hand  numnbess.  She states that it has gotten more intense over the last over the next few weeks.  She states nothing makes it better or worse and it comes and goes but is becoming more persistent.    Migraine headaches. This is a chronic problem and she has been taking Maxalt and Ubrelvy as needed.  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 She has been using a Climara patch and that helps with migraine.   She is an occasional smoker.     She has chronic back pain and has been cared for by surgery in the past. She now manages chronic pain issues with gabapentin. The current dose is working well for herh maintenance. She states she is doing well.        Mammogram:ordered  Colon cancer screening:upto date  Bone Density:ordr      REVIEW OF SYSTEMS  Constitutional: Negative.    HENT: Negative.    Eyes: Negative.    Respiratory: Negative.    Cardiovascular: Negative.    Gastrointestinal: Negative.    Endocrine: Negative.    Genitourinary: Negative.    Musculoskeletal: Chronic back pain, left hand and arm pain  Skin: Negative.    Allergic/Immunologic: Negative.    Neurological: Migraine headaches  Hematological: Negative.    Psychiatric/Behavioral: Negative.    I have reviewed the ROS as documented by the MA. Billy Poole MD       PHYSICAL EXAM    Vitals:    04/13/23 1014   BP: 100/60   Pulse: 62   Resp: 16   SpO2: 95%   Weight: 69.9 kg (154 lb)   Height: 172.7 cm (68\")     GENERAL: alert and oriented, afebrile and vital signs stable  HEENT: oral mucosa moist, PEERLA, EOM, conjunctiva normal  No cervical adenopathy  LUNGS: clear to ascultation bilaterally, no rales, ronchi or wheezing  HEART: RRR S1 S2 without murmers, thrills, rubs or gallops  CHEST WALL: within normal limits, no " tenderness  ABDOMEN: WNL. Normal BS.  EXTREMITIES: No clubbing, cyanosis or edema noted. Normal Pulses.  Tenderness to palpation of left wrist.  No swelling noted.  SKIN: warm, dry, no rashes noted  NEURO: CN II- XII grossly intact  PSYCH: Good mood and positive affect  ASSESSMENT AND PLAN  Problem List Items Addressed This Visit        Musculoskeletal and Injuries    Chronic midline low back pain without sciatica    Overview     She has chronic pain and she is taking gabapentin and doing well.The patient has read and signed the [facility] Controlled Substance Contract.  I will continue to see patient for regular follow up appointments.  She are well controlled on current medication.  KHOI has been reviewed by me and is updated every 3 months. The patient is aware of the potential for addiction and dependence.                Neuro    Migraine with aura    Overview     This is a chronic problem that responds well to Maxalt and Ubrelvey.          Relevant Medications    ubrogepant (UBRELVY) 100 MG tablet   Other Visit Diagnoses     Routine general medical examination at a health care facility    -  Primary    Need for vaccination        Post-menopausal        Relevant Orders    DEXA Bone Density Axial    Encounter for screening mammogram for malignant neoplasm of breast        Relevant Orders    Mammo screening digital tomosynthesis bilateral w CAD    Numbness of left hand        Relevant Orders    Ambulatory Referral to Hand Surgery        Routine health maintenance reviewed and discussed with Linda.  BMI is within normal parameters. No other follow-up for BMI required.     Orders Placed This Encounter   Procedures   • Mammo screening digital tomosynthesis bilateral w CAD   • DEXA Bone Density Axial   • Pneumococcal Conjugate Vaccine 20-Valent (PCV20)   • Ambulatory Referral to Hand Surgery      Return in about 6 months (around 10/13/2023) for Recheck.

## 2023-04-13 NOTE — PATIENT INSTRUCTIONS
Your Annual Physical  Personal Prevention Plan      Date of Office Visit:    Encounter Provider:  Billy Poole MD  Place of Service:  [facility] PRIMARY CARE  Patient Name: Linda Cooper  :  1955    As part of the Annual Physical portion of your visit today, we are providing you with this personalized preventive plan of services (PPPS). This plan is based upon recommendations of the [place] Preventive Services Task Force (USPSTF) and the Advisory Committee on Immunization Practices (ACIP).    This lists the preventive care services that should be considered, and provides dates of when you are due. Items listed as completed are up-to-date and do not require any further intervention.    Health Maintenance   Topic Date Due    MAMMOGRAM  Never done    Pneumococcal Vaccine 65+ (2 - PCV) 2017    DXA SCAN  2022    TDAP/TD VACCINES (1 - Tdap) 2024 (Originally 1974)    INFLUENZA VACCINE  2023    COLORECTAL CANCER SCREENING  10/28/2023    ANNUAL PHYSICAL  2024    HEPATITIS C SCREENING  Completed    COVID-19 Vaccine  Completed    ZOSTER VACCINE  Completed    PAP SMEAR  Discontinued       Orders Placed This Encounter   Procedures    Mammo screening digital tomosynthesis bilateral w CAD     Standing Status:   Future     Standing Expiration Date:   2024     Scheduling Instructions:      [facility]     Order Specific Question:   Reason for Exam:     Answer:   screening     Order Specific Question:   Does this patient have a diabetic monitoring/medication delivering device on?     Answer:   No     Order Specific Question:   Release to patient     Answer:   Routine Release    DEXA Bone Density Axial     Standing Status:   Future     Standing Expiration Date:   2024     Scheduling Instructions:      [facility]     Order Specific Question:   Is patient taking or have taken long term Glucocorticoid (steroids)?     Answer:   No     Order Specific  Question:   Does the patient have rheumatoid arthritis?     Answer:   No     Order Specific Question:   Does the patient have secondary osteoporosis?     Answer:   No     Order Specific Question:   Reason for Exam:     Answer:   post menopausal     Order Specific Question:   Does this patient have a diabetic monitoring/medication delivering device on?     Answer:   No     Order Specific Question:   Release to patient     Answer:   Routine Release    Pneumococcal Conjugate Vaccine 20-Valent (PCV20)    Ambulatory Referral to Hand Surgery     Referral Priority:   Routine     Referral Type:   Consultation     Referral Reason:   Specialty Services Required     Referred to Provider:   Nicola Bhatt MD     Requested Specialty:   Hand Surgery     Number of Visits Requested:   1       Return in about 6 months (around 10/13/2023) for Recheck.

## 2023-04-20 NOTE — PROGRESS NOTES
Subjective   Patient ID: Linda Cooper is a 67 y.o. female is here today for 6 month follow-up.    Its been about 6 months since have seen this patient.  She is  at the [facility].  She had a microdiscectomy in the 1990s at L5-S1.  A decompression by me at L4-L5 and L5-S1 in 2012 and then an L2-L3 and L3-L4 discectomy by me in 2019.  She has had some intermittent episodes of right leg pain and once in a while even some left leg pain and back pain but none of it is debilitating.  She continues to swim, do Pilates and do her stretches.  She takes gabapentin from her primary care physician and is generally doing well.  She is planning on retiring and moving to [place] sometime in the next few years.  We will try and find a recommendation for her to see a neurosurgeon up there when the time comes.  Incidentally about 3 weeks ago she had some numbness and tingling in her left hand.  She has no Tinel's sign but it does seem to involve the distribution of the median nerve but I think this is probably early carpal tunnel syndrome.  I told her to just watch it for now take some anti-inflammatories and if he gets worse to let me know.  We can try splinting.  If that does not work then we should get an EMG and nerve conduction study and refer to hand surgery but I do not think it is necessary right now.  I will follow her for that for now.  We will keep her on the schedule and I will see her in a year, sooner if there are issues with the back that flareup.        History of Present Illness    The following portions of the patient's history were reviewed and updated as appropriate: allergies, current medications, past family history, past medical history, past social history, past surgical history and problem list.    Review of Systems   Constitutional: Negative for fever.   All other systems reviewed and are negative.          Objective     Vitals:    04/24/23 1239   BP: 110/70  "  Pulse: 70   Resp: 16   SpO2: 95%   Weight: 65.8 kg (145 lb)   Height: 172.7 cm (68\")     Body mass index is 22.05 kg/m².    Tobacco Use: High Risk   • Smoking Tobacco Use: Some Days   • Smokeless Tobacco Use: Never   • Passive Exposure: Not on file          Physical Exam  Constitutional:       Appearance: She is well-developed.   HENT:      Head: Normocephalic and atraumatic.   Eyes:      Extraocular Movements: EOM normal.      Conjunctiva/sclera: Conjunctivae normal.      Pupils: Pupils are equal, round, and reactive to light.   Neck:      Vascular: No carotid bruit.   Neurological:      Mental Status: She is oriented to person, place, and time.      Coordination: Finger-Nose-Finger Test and Heel to Shin Test normal.      Gait: Gait is intact.      Deep Tendon Reflexes:      Reflex Scores:       Tricep reflexes are 2+ on the right side and 2+ on the left side.       Bicep reflexes are 2+ on the right side and 2+ on the left side.       Brachioradialis reflexes are 2+ on the right side and 2+ on the left side.       Patellar reflexes are 2+ on the right side and 2+ on the left side.       Achilles reflexes are 2+ on the right side and 2+ on the left side.  Psychiatric:         Speech: Speech normal.       Neurologic Exam     Mental Status   Oriented to person, place, and time.   Registration of memory: Good recent and remote memory.   Attention: normal. Concentration: normal.   Speech: speech is normal   Level of consciousness: alert  Knowledge: consistent with education.     Cranial Nerves     CN II   Visual fields full to confrontation.   Visual acuity: normal    CN III, IV, VI   Pupils are equal, round, and reactive to light.  Extraocular motions are normal.     CN V   Facial sensation intact.   Right corneal reflex: normal  Left corneal reflex: normal    CN VII   Facial expression full, symmetric.   Right facial weakness: none  Left facial weakness: none    CN VIII   Hearing: intact    CN IX, X   Palate: " symmetric    CN XI   Right sternocleidomastoid strength: normal  Left sternocleidomastoid strength: normal    CN XII   Tongue: not atrophic  Tongue deviation: none    Motor Exam   Muscle bulk: normal  Right arm tone: normal  Left arm tone: normal  Right leg tone: normal  Left leg tone: normal    Strength   Strength 5/5 except as noted.     Sensory Exam   Light touch normal.     Gait, Coordination, and Reflexes     Gait  Gait: normal    Coordination   Finger to nose coordination: normal  Heel to shin coordination: normal    Reflexes   Right brachioradialis: 2+  Left brachioradialis: 2+  Right biceps: 2+  Left biceps: 2+  Right triceps: 2+  Left triceps: 2+  Right patellar: 2+  Left patellar: 2+  Right achilles: 2+  Left achilles: 2+  Right : 2+  Left : 2+          Assessment & Plan   Independent Review of Radiographic Studies:      I personally reviewed the images from the following studies.    I reviewed the lumbar MRI done on 8/9/2022 in [place] which does show recurrent spinal stenosis with severe right foraminal stenosis at L2-L3 and L3-L4.  There are postoperative changes from L2-S1.  There is some recurrent stenosis bilaterally at L4 and L5 and L5 and S1 with a spondylolisthesis at multiple levels and a rather pronounced scoliosis in the lumbar spine.  Agree with the report.    Medical Decision Making:      We will keep an eye on her and I will see her in 1 year.  If things flareup in the low back of the legs she will let us know.  Likewise in the left hand if things worsen she is to let me know and we can try some splinting.  If that is not sufficient then an EMG and nerve conduction study and referral to hand surgery would be the next step.        Diagnoses and all orders for this visit:    1. History of spinal surgery (Primary)    2. Chronic midline low back pain without sciatica    3. Spinal stenosis of lumbar region with neurogenic claudication    4. Dextroscoliosis of lumbar spine    5.  Spondylolisthesis of lumbar region    6. Carpal tunnel syndrome of left wrist      Return in about 1 year (around 4/24/2024) for face to face.

## 2023-04-24 ENCOUNTER — OFFICE VISIT (OUTPATIENT)
Dept: NEUROSURGERY | Facility: CLINIC | Age: 68
End: 2023-04-24
Payer: COMMERCIAL

## 2023-04-24 VITALS
HEIGHT: 68 IN | DIASTOLIC BLOOD PRESSURE: 70 MMHG | SYSTOLIC BLOOD PRESSURE: 110 MMHG | BODY MASS INDEX: 21.98 KG/M2 | OXYGEN SATURATION: 95 % | HEART RATE: 70 BPM | RESPIRATION RATE: 16 BRPM | WEIGHT: 145 LBS

## 2023-04-24 DIAGNOSIS — Z98.890 HISTORY OF SPINAL SURGERY: Primary | ICD-10-CM

## 2023-04-24 DIAGNOSIS — G56.02 CARPAL TUNNEL SYNDROME OF LEFT WRIST: ICD-10-CM

## 2023-04-24 DIAGNOSIS — G89.29 CHRONIC MIDLINE LOW BACK PAIN WITHOUT SCIATICA: ICD-10-CM

## 2023-04-24 DIAGNOSIS — M54.50 CHRONIC MIDLINE LOW BACK PAIN WITHOUT SCIATICA: ICD-10-CM

## 2023-04-24 DIAGNOSIS — M43.16 SPONDYLOLISTHESIS OF LUMBAR REGION: ICD-10-CM

## 2023-04-24 DIAGNOSIS — M48.062 SPINAL STENOSIS OF LUMBAR REGION WITH NEUROGENIC CLAUDICATION: ICD-10-CM

## 2023-04-24 DIAGNOSIS — M41.86 DEXTROSCOLIOSIS OF LUMBAR SPINE: ICD-10-CM

## 2023-04-24 PROCEDURE — 99213 OFFICE O/P EST LOW 20 MIN: CPT | Performed by: NEUROLOGICAL SURGERY

## 2023-04-25 ENCOUNTER — TELEPHONE (OUTPATIENT)
Dept: NEUROSURGERY | Facility: CLINIC | Age: 68
End: 2023-04-25
Payer: COMMERCIAL

## 2023-04-25 NOTE — TELEPHONE ENCOUNTER
LVM: Per Dr. DUEÑAS, he wanted me to inform patient that the neurosurgeons she should make contact with at [facility] [place], [place] are Nelson Leonardo M.D., or Bucky Nogueira M.D. He said they specialize in complex spinal surgery, which would include fusion surgery. I told her to call us back if she has any questions.[facility] can read/advise.

## 2023-05-02 ENCOUNTER — HOSPITAL ENCOUNTER (OUTPATIENT)
Dept: MAMMOGRAPHY | Facility: HOSPITAL | Age: 68
Discharge: HOME | End: 2023-05-02
Admitting: FAMILY MEDICINE
Payer: COMMERCIAL

## 2023-05-02 DIAGNOSIS — Z12.31 ENCOUNTER FOR SCREENING MAMMOGRAM FOR MALIGNANT NEOPLASM OF BREAST: ICD-10-CM

## 2023-05-02 PROCEDURE — 77067 SCR MAMMO BI INCL CAD: CPT

## 2023-05-02 PROCEDURE — 77063 BREAST TOMOSYNTHESIS BI: CPT

## 2023-05-16 DIAGNOSIS — M51.36 DDD (DEGENERATIVE DISC DISEASE), LUMBAR: ICD-10-CM

## 2023-05-16 DIAGNOSIS — M54.31 SCIATICA OF RIGHT SIDE: ICD-10-CM

## 2023-05-16 DIAGNOSIS — G43.109 MIGRAINE WITH AURA AND WITHOUT STATUS MIGRAINOSUS, NOT INTRACTABLE: ICD-10-CM

## 2023-05-16 RX ORDER — GABAPENTIN 300 MG/1
300 CAPSULE ORAL 2 TIMES DAILY
Qty: 180 CAPSULE | Refills: 1 | Status: SHIPPED | OUTPATIENT
Start: 2023-05-16

## 2023-05-16 NOTE — TELEPHONE ENCOUNTER
Last OV 04/13/2023  No follow-up scheduled  Last Refill estradiol 06/14/2022  Last Refill gabapentin 01/18/2023  Last Refill ubrogepant 04/13/2023

## 2023-12-02 DIAGNOSIS — M54.31 SCIATICA OF RIGHT SIDE: ICD-10-CM

## 2023-12-02 DIAGNOSIS — M51.36 DDD (DEGENERATIVE DISC DISEASE), LUMBAR: ICD-10-CM

## 2023-12-04 RX ORDER — GABAPENTIN 300 MG/1
300 CAPSULE ORAL 2 TIMES DAILY
Qty: 180 CAPSULE | Refills: 1 | Status: SHIPPED | OUTPATIENT
Start: 2023-12-04

## 2024-03-11 DIAGNOSIS — G43.109 MIGRAINE WITH AURA AND WITHOUT STATUS MIGRAINOSUS, NOT INTRACTABLE: ICD-10-CM

## 2024-04-22 NOTE — PROGRESS NOTES
"Subjective   Patient ID: Linda Cooper is a 68 y.o. female is here today for follow-up.    It has been about a year since I have seen this very delightful patient.  She remains at the [facility] as a .  Again, she had a microdiscectomy at L5-S1 in the 1990s, decompression by me at L4-L5 and L5-S1 in 2012 and L2-L3 and L3-L4 discectomy by me in 2019.  She remains active doing Pilates, swimming and general exercise.  Her left wrist and fingers are still an issue.  She tells me she had a fracture in her left wrist years ago and so some of the symptoms may be due to some progressive arthritis.  I had talked about sending her to see Dr. Bhatt, but she wants to hold off at this time.  She is generally doing well.  At some point she is going to retire and relocate to [place] but until then we will continue to follow her and I will see her in 1 year, sooner if there is a problem.  She did get the list of doctors from the [facility] who do complex spine work that she could see at some point when she relocates to that area.        History of Present Illness    The following portions of the patient's history were reviewed and updated as appropriate: allergies, current medications, past family history, past medical history, past social history, past surgical history, and problem list.    Review of Systems   Constitutional:  Negative for fever.   Musculoskeletal:  Negative for back pain and gait problem.   Neurological:  Negative for weakness and numbness.   All other systems reviewed and are negative.          Objective     Vitals:    04/24/24 1035   BP: 124/76   BP Location: Left arm   Patient Position: Sitting   Cuff Size: Adult   Resp: 20   Weight: 65.8 kg (145 lb)   Height: 172.7 cm (67.99\")   PainSc: 0-No pain     Body mass index is 22.05 kg/m².    Tobacco Use: High Risk (4/24/2024)    Patient History     Smoking Tobacco Use: Some Days     Smokeless Tobacco " Use: Never     Passive Exposure: Not on file          Physical Exam  Constitutional:       Appearance: She is well-developed.   HENT:      Head: Normocephalic and atraumatic.   Eyes:      Extraocular Movements: EOM normal.      Conjunctiva/sclera: Conjunctivae normal.      Pupils: Pupils are equal, round, and reactive to light.   Neck:      Vascular: No carotid bruit.   Neurological:      Mental Status: She is oriented to person, place, and time.      Coordination: Finger-Nose-Finger Test and Heel to Shin Test normal.      Gait: Gait is intact.      Deep Tendon Reflexes:      Reflex Scores:       Tricep reflexes are 2+ on the right side and 2+ on the left side.       Bicep reflexes are 2+ on the right side and 2+ on the left side.       Brachioradialis reflexes are 2+ on the right side and 2+ on the left side.       Patellar reflexes are 2+ on the right side and 2+ on the left side.       Achilles reflexes are 2+ on the right side and 2+ on the left side.  Psychiatric:         Speech: Speech normal.       Neurologic Exam     Mental Status   Oriented to person, place, and time.   Registration of memory: Good recent and remote memory.   Attention: normal. Concentration: normal.   Speech: speech is normal   Level of consciousness: alert  Knowledge: consistent with education.     Cranial Nerves     CN II   Visual fields full to confrontation.   Visual acuity: normal    CN III, IV, VI   Pupils are equal, round, and reactive to light.  Extraocular motions are normal.     CN V   Facial sensation intact.   Right corneal reflex: normal  Left corneal reflex: normal    CN VII   Facial expression full, symmetric.   Right facial weakness: none  Left facial weakness: none    CN VIII   Hearing: intact    CN IX, X   Palate: symmetric    CN XI   Right sternocleidomastoid strength: normal  Left sternocleidomastoid strength: normal    CN XII   Tongue: not atrophic  Tongue deviation: none    Motor Exam   Muscle bulk: normal  Right arm  tone: normal  Left arm tone: normal  Right leg tone: normal  Left leg tone: normal    Strength   Strength 5/5 except as noted.     Sensory Exam   Light touch normal.     Gait, Coordination, and Reflexes     Gait  Gait: normal    Coordination   Finger to nose coordination: normal  Heel to shin coordination: normal    Reflexes   Right brachioradialis: 2+  Left brachioradialis: 2+  Right biceps: 2+  Left biceps: 2+  Right triceps: 2+  Left triceps: 2+  Right patellar: 2+  Left patellar: 2+  Right achilles: 2+  Left achilles: 2+  Right : 2+  Left : 2+          Assessment & Plan   Independent Review of Radiographic Studies:      I personally reviewed the images from the following studies.    I reviewed the lumbar MRI done on 8/9/2022 in [place] which does show recurrent spinal stenosis with severe right foraminal stenosis at L2-L3 and L3-L4.  There are postoperative changes from L2-S1.  There is some recurrent stenosis bilaterally at L4 and L5 and L5 and S1 with a spondylolisthesis at multiple levels and a rather pronounced scoliosis in the lumbar spine.  Agree with the report.     Medical Decision Making:      Overall, doing well.  I will see her in 1 year.  If the left hand and wrist get worse, she will let me know and we can send her to see Dr. Bhatt.  If symptoms in her back or legs flareup, she will let me know.        Diagnoses and all orders for this visit:    1. Dextroscoliosis of lumbar spine (Primary)    2. Carpal tunnel syndrome of left wrist    3. Spinal stenosis of lumbar region with neurogenic claudication    4. DDD (degenerative disc disease), lumbar    5. History of spinal surgery      Return in about 1 year (around 4/24/2025) for face to face.

## 2024-04-24 ENCOUNTER — OFFICE VISIT (OUTPATIENT)
Dept: NEUROSURGERY | Facility: CLINIC | Age: 69
End: 2024-04-24
Payer: COMMERCIAL

## 2024-04-24 VITALS
SYSTOLIC BLOOD PRESSURE: 124 MMHG | RESPIRATION RATE: 20 BRPM | DIASTOLIC BLOOD PRESSURE: 76 MMHG | WEIGHT: 145 LBS | BODY MASS INDEX: 21.98 KG/M2 | HEIGHT: 68 IN

## 2024-04-24 DIAGNOSIS — Z98.890 HISTORY OF SPINAL SURGERY: ICD-10-CM

## 2024-04-24 DIAGNOSIS — M41.86 DEXTROSCOLIOSIS OF LUMBAR SPINE: Primary | ICD-10-CM

## 2024-04-24 DIAGNOSIS — M51.36 DDD (DEGENERATIVE DISC DISEASE), LUMBAR: ICD-10-CM

## 2024-04-24 DIAGNOSIS — G56.02 CARPAL TUNNEL SYNDROME OF LEFT WRIST: ICD-10-CM

## 2024-04-24 DIAGNOSIS — M48.062 SPINAL STENOSIS OF LUMBAR REGION WITH NEUROGENIC CLAUDICATION: ICD-10-CM

## 2024-04-24 PROCEDURE — 99213 OFFICE O/P EST LOW 20 MIN: CPT | Performed by: NEUROLOGICAL SURGERY

## 2024-04-30 ENCOUNTER — OFFICE VISIT (OUTPATIENT)
Dept: FAMILY MEDICINE CLINIC | Facility: CLINIC | Age: 69
End: 2024-04-30
Payer: COMMERCIAL

## 2024-04-30 VITALS
WEIGHT: 147 LBS | BODY MASS INDEX: 22.28 KG/M2 | HEART RATE: 65 BPM | HEIGHT: 68 IN | SYSTOLIC BLOOD PRESSURE: 114 MMHG | OXYGEN SATURATION: 99 % | DIASTOLIC BLOOD PRESSURE: 64 MMHG | RESPIRATION RATE: 16 BRPM

## 2024-04-30 DIAGNOSIS — Z13.220 SCREENING FOR LIPOID DISORDERS: ICD-10-CM

## 2024-04-30 DIAGNOSIS — M54.50 CHRONIC MIDLINE LOW BACK PAIN WITHOUT SCIATICA: ICD-10-CM

## 2024-04-30 DIAGNOSIS — G47.9 SLEEP DISORDER: ICD-10-CM

## 2024-04-30 DIAGNOSIS — Z23 NEED FOR VACCINATION: ICD-10-CM

## 2024-04-30 DIAGNOSIS — G89.29 CHRONIC MIDLINE LOW BACK PAIN WITHOUT SCIATICA: ICD-10-CM

## 2024-04-30 DIAGNOSIS — Z00.00 ROUTINE GENERAL MEDICAL EXAMINATION AT A HEALTH CARE FACILITY: Primary | ICD-10-CM

## 2024-04-30 DIAGNOSIS — G43.109 MIGRAINE WITH AURA AND WITHOUT STATUS MIGRAINOSUS, NOT INTRACTABLE: ICD-10-CM

## 2024-04-30 PROCEDURE — 90715 TDAP VACCINE 7 YRS/> IM: CPT | Performed by: FAMILY MEDICINE

## 2024-04-30 PROCEDURE — 90471 IMMUNIZATION ADMIN: CPT | Performed by: FAMILY MEDICINE

## 2024-04-30 PROCEDURE — 99397 PER PM REEVAL EST PAT 65+ YR: CPT | Performed by: FAMILY MEDICINE

## 2024-04-30 RX ORDER — ZOLPIDEM TARTRATE 6.25 MG/1
6.25 TABLET, FILM COATED, EXTENDED RELEASE ORAL NIGHTLY PRN
Qty: 10 TABLET | Refills: 0 | Status: SHIPPED | OUTPATIENT
Start: 2024-04-30

## 2024-04-30 NOTE — PATIENT INSTRUCTIONS
Your Annual Physical  Personal Prevention Plan      Date of Office Visit:    Encounter Provider:  Billy Poole MD  Place of Service:  [facility] PR[facility]ARE  Patient Name: Linda Cooper  :  1955    As part of the Annual Physical portion of your visit today, we are providing you with this personalized preventive plan of services (PPPS). This plan is based upon recommendations of the [place] Preventive Services Task Force (USPSTF) and the Advisory Committee on Immunization Practices (ACIP).    This lists the preventive care services that should be considered, and provides dates of when you are due. Items listed as completed are up-to-date and do not require any further intervention.    Health Maintenance   Topic Date Due    TDAP/TD VACCINES (1 - Tdap) Never done    COLORECTAL CANCER SCREENING  2024 (Originally 1955)    COVID-19 Vaccine (6 - -24 season) 2024 (Originally 2023)    RSV Vaccine - Adults (1 - 1-dose 60+ series) 2025 (Originally 2016)    INFLUENZA VACCINE  2024    DXA SCAN  2024    ANNUAL PHYSICAL  2025    MAMMOGRAM  2025    HEPATITIS C SCREENING  Completed    Pneumococcal Vaccine 65+  Completed    ZOSTER VACCINE  Completed    PAP SMEAR  Discontinued       Orders Placed This Encounter   Procedures    Tdap Vaccine => 6yo IM (BOOSTRIX)    Lipid Panel With LDL / HDL Ratio     Order Specific Question:   Release to patient     Answer:   Routine Release [[id number]]    Comprehensive Metabolic Panel     Order Specific Question:   Release to patient     Answer:   Routine Release [[id number]]    CBC (No Diff)     Order Specific Question:   Release to patient     Answer:   Routine Release [[id number]]       No follow-ups on file.

## 2024-04-30 NOTE — PROGRESS NOTES
"Preventive Exam    History of Present Illness: Linda is here for check up and review of routine health maintenance. She states she is doing well and we addressed the following concerns:    Migraine headaches. This is a chronic problem and she has been taking Maxalt and Ubrelvy as needed.  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 She has been using a Climara patch and that helps with migraine.   She is interested in trying another migraine medication and samples were given to her today for Nurtec.     She has chronic back pain and has been cared for by surgery in the past. She now manages chronic pain issues with gabapentin. The current dose is working well for herh maintenance. She states she is doing well.    She is planning a trip to [place] and notes that she has a very difficult time sleeping on a long flight.  She is asking for a refill of the medication she has taken in the past that has helped her with sleep.    Pap Smear:Hysterectomy  Mammogram:5/2023    Colon cancer screening:[place], 10/28/2020.  Wants to order in the fall 2024  STI screening:NI  Tobacco use : Occasional  Bone Density:12/2022      REVIEW OF SYSTEMS  Constitutional: Negative.    HENT: Negative.    Eyes: Negative.    Respiratory: Negative.    Cardiovascular: Negative.    Gastrointestinal: Negative.    Endocrine: Negative.    Genitourinary: Negative.    Musculoskeletal: Negative.  Skin: Negative.    Allergic/Immunologic: Negative.    Neurological: Negative.    Hematological: Negative.    Psychiatric/Behavioral: Negative.    I have reviewed the ROS as documented by the MA. Billy Poole MD       PHYSICAL EXAM    Vitals:    04/30/24 1020   BP: 114/64   Pulse: 65   Resp: 16   SpO2: 99%   Weight: 66.7 kg (147 lb)   Height: 172.7 cm (68\")     GENERAL: alert and oriented, afebrile and vital signs stable  HEENT: oral mucosa moist, PEERLA, EOM, conjunctiva normal  No cervical adenopathy  LUNGS: clear to ascultation " bilaterally, no rales, ronchi or wheezing  HEART: RRR S1 S2 without murmers, thrills, rubs or gallops  CHEST WALL: within normal limits, no tenderness  ABDOMEN: WNL. Normal BS.  EXTREMITIES: No clubbing, cyanosis or edema noted. Normal Pulses.  SKIN: warm, dry, no rashes noted  NEURO: CN II- XII grossly intact  PSYCH: Good mood and positive affect  ASSESSMENT AND PLAN  Problem List Items Addressed This Visit          Musculoskeletal and Injuries    Chronic midline low back pain without sciatica    Overview     She has chronic pain and she is taking gabapentin and doing well.The patient has read and signed the [facility] Controlled Substance Contract.  I will continue to see patient for regular follow up appointments.  She is well controlled on current medication.  KHOI has been reviewed by me . The patient is aware of the potential for addiction and dependence.                Neuro    Migraine with aura    Overview     This is a chronic problem that responds well to Maxalt and Ubrelvey.           Other Visit Diagnoses       Routine general medical examination at a health care facility    -  Primary    Relevant Orders    Comprehensive Metabolic Panel    CBC (No Diff)    Need for vaccination        Relevant Orders    Tdap Vaccine => 6yo IM (BOOSTRIX) (Completed)    Screening for lipoid disorders        Relevant Orders    Lipid Panel With LDL / HDL Ratio    Sleep disorder        Relevant Medications    zolpidem CR (Ambien CR) 6.25 MG CR tablet          Routine health maintenance reviewed and discussed with Linda.  BMI is within normal parameters. No other follow-up for BMI required.     Orders Placed This Encounter   Procedures    Tdap Vaccine => 6yo IM (BOOSTRIX)    Lipid Panel With LDL / HDL Ratio    Comprehensive Metabolic Panel    CBC (No Diff)      Return in about 6 months (around 10/30/2024).

## 2024-05-01 LAB
ALBUMIN SERPL-MCNC: 4.2 G/DL (ref 3.5–5.2)
ALBUMIN/GLOB SERPL: 1.6 G/DL
ALP SERPL-CCNC: 73 U/L (ref 39–117)
ALT SERPL-CCNC: 13 U/L (ref 1–33)
AST SERPL-CCNC: 18 U/L (ref 1–32)
BILIRUB SERPL-MCNC: 0.3 MG/DL (ref 0–1.2)
BUN SERPL-MCNC: 9 MG/DL (ref 8–23)
BUN/CREAT SERPL: 15 (ref 7–25)
CALCIUM SERPL-MCNC: 9.6 MG/DL (ref 8.6–10.5)
CHLORIDE SERPL-SCNC: 102 MMOL/L (ref 98–107)
CHOLEST SERPL-MCNC: 187 MG/DL (ref 0–200)
CO2 SERPL-SCNC: 27.6 MMOL/L (ref 22–29)
CREAT SERPL-MCNC: 0.6 MG/DL (ref 0.57–1)
EGFRCR SERPLBLD CKD-EPI 2021: 98.5 ML/MIN/1.73
ERYTHROCYTE [DISTWIDTH] IN BLOOD BY AUTOMATED COUNT: 12.9 % (ref 12.3–15.4)
GLOBULIN SER CALC-MCNC: 2.6 GM/DL
GLUCOSE SERPL-MCNC: 75 MG/DL (ref 65–99)
HCT VFR BLD AUTO: 41.2 % (ref 34–46.6)
HDLC SERPL-MCNC: 65 MG/DL (ref 40–60)
HGB BLD-MCNC: 13.3 G/DL (ref 12–15.9)
LDLC SERPL CALC-MCNC: 108 MG/DL (ref 0–100)
LDLC/HDLC SERPL: 1.65 {RATIO}
MCH RBC QN AUTO: 30 PG (ref 26.6–33)
MCHC RBC AUTO-ENTMCNC: 32.3 G/DL (ref 31.5–35.7)
MCV RBC AUTO: 92.8 FL (ref 79–97)
PLATELET # BLD AUTO: 285 10*3/MM3 (ref 140–450)
POTASSIUM SERPL-SCNC: 4.6 MMOL/L (ref 3.5–5.2)
PROT SERPL-MCNC: 6.8 G/DL (ref 6–8.5)
RBC # BLD AUTO: 4.44 10*6/MM3 (ref 3.77–5.28)
SODIUM SERPL-SCNC: 137 MMOL/L (ref 136–145)
TRIGL SERPL-MCNC: 75 MG/DL (ref 0–150)
VLDLC SERPL CALC-MCNC: 14 MG/DL (ref 5–40)
WBC # BLD AUTO: 4.94 10*3/MM3 (ref 3.4–10.8)

## 2024-05-09 ENCOUNTER — CLINICAL SUPPORT (OUTPATIENT)
Dept: FAMILY MEDICINE CLINIC | Facility: CLINIC | Age: 69
End: 2024-05-09
Payer: COMMERCIAL

## 2024-05-09 DIAGNOSIS — Z23 NEED FOR VACCINATION: Primary | ICD-10-CM

## 2024-05-09 PROCEDURE — 90480 ADMN SARSCOV2 VAC 1/ONLY CMP: CPT | Performed by: FAMILY MEDICINE

## 2024-05-09 PROCEDURE — 91320 SARSCV2 VAC 30MCG TRS-SUC IM: CPT | Performed by: FAMILY MEDICINE

## 2024-07-03 ENCOUNTER — TELEPHONE (OUTPATIENT)
Dept: FAMILY MEDICINE CLINIC | Facility: CLINIC | Age: 69
End: 2024-07-03
Payer: COMMERCIAL

## 2024-07-03 NOTE — TELEPHONE ENCOUNTER
PHARMACY CALLED IN REGARDS TO PRIOR AUTHORIZATION FOR [facility]. THE PRIOR AUTHOIZATION HAS BEEN APPROVED PER [employer]     CALL BACK NUMBER [phone]      [facility] [employer] (New Address) - [address] AT Previously: [address] [place] - [phone]  - [phone]

## 2024-07-31 DIAGNOSIS — M51.36 DDD (DEGENERATIVE DISC DISEASE), LUMBAR: ICD-10-CM

## 2024-07-31 DIAGNOSIS — M54.31 SCIATICA OF RIGHT SIDE: ICD-10-CM

## 2024-07-31 RX ORDER — GABAPENTIN 300 MG/1
300 CAPSULE ORAL 2 TIMES DAILY
Qty: 180 CAPSULE | Refills: 1 | Status: SHIPPED | OUTPATIENT
Start: 2024-07-31

## 2024-10-23 ENCOUNTER — OFFICE VISIT (OUTPATIENT)
Dept: INTERNAL MEDICINE | Facility: CLINIC | Age: 69
End: 2024-10-23
Payer: COMMERCIAL

## 2024-10-23 VITALS
HEIGHT: 68 IN | RESPIRATION RATE: 18 BRPM | HEART RATE: 70 BPM | WEIGHT: 146.3 LBS | SYSTOLIC BLOOD PRESSURE: 100 MMHG | BODY MASS INDEX: 22.17 KG/M2 | DIASTOLIC BLOOD PRESSURE: 60 MMHG | OXYGEN SATURATION: 97 %

## 2024-10-23 DIAGNOSIS — B35.3 TINEA PEDIS OF RIGHT FOOT: ICD-10-CM

## 2024-10-23 DIAGNOSIS — Z78.0 POSTMENOPAUSAL: ICD-10-CM

## 2024-10-23 DIAGNOSIS — M54.50 CHRONIC MIDLINE LOW BACK PAIN WITHOUT SCIATICA: ICD-10-CM

## 2024-10-23 DIAGNOSIS — G43.109 MIGRAINE WITH AURA AND WITHOUT STATUS MIGRAINOSUS, NOT INTRACTABLE: Primary | ICD-10-CM

## 2024-10-23 DIAGNOSIS — G89.29 CHRONIC MIDLINE LOW BACK PAIN WITHOUT SCIATICA: ICD-10-CM

## 2024-10-23 DIAGNOSIS — Z12.11 COLON CANCER SCREENING: ICD-10-CM

## 2024-10-23 DIAGNOSIS — G47.9 SLEEP DISORDER: ICD-10-CM

## 2024-10-23 DIAGNOSIS — Z79.899 CONTROLLED SUBSTANCE AGREEMENT SIGNED: ICD-10-CM

## 2024-10-23 PROCEDURE — 90480 ADMN SARSCOV2 VAC 1/ONLY CMP: CPT | Performed by: STUDENT IN AN ORGANIZED HEALTH CARE EDUCATION/TRAINING PROGRAM

## 2024-10-23 PROCEDURE — 91320 SARSCV2 VAC 30MCG TRS-SUC IM: CPT | Performed by: STUDENT IN AN ORGANIZED HEALTH CARE EDUCATION/TRAINING PROGRAM

## 2024-10-23 PROCEDURE — 99214 OFFICE O/P EST MOD 30 MIN: CPT | Performed by: STUDENT IN AN ORGANIZED HEALTH CARE EDUCATION/TRAINING PROGRAM

## 2024-10-23 RX ORDER — ZOLPIDEM TARTRATE 6.25 MG/1
6.25 TABLET, FILM COATED, EXTENDED RELEASE ORAL NIGHTLY PRN
Qty: 30 TABLET | Refills: 0 | Status: SHIPPED | OUTPATIENT
Start: 2024-10-23 | End: 2024-11-22

## 2024-10-23 NOTE — PROGRESS NOTES
Chief Complaint  Establish Care and Migraine    Subjective        Linda Cooper presents to [facility] PRIMARY CARE  History of Present Illness  This is a 69-year-old female with chronic medical conditions of migraine with aura, chronic low back pain, osteopenia who presents to [facility].    Migraine with aura: She is on Ubrelvy as well as Nurtec.  She reports she has both migraine and tension headaches.  She is prescribed 75 mg Nurtec as well as 100 mg Ubrelvy without much benefit.  She reports when he takes she takes these medications they make her feel groggy.  She otherwise will take Excedrin that is somewhat beneficial.  She will have 1-2 migraines a week.  Other prophylactic options have been challenging given her low blood pressure at baseline.  She has had some stressors with a new house in the past month.  Chronic low back pain: She is on 300 mg gabapentin twice a day.  She reports this is overall helpful.  She follows with Dr. Camarillo as she has had multiple back surgeries in the past.  Postmenopausal state: She is on an estradiol patch which is beneficial.  She has had a hysterectomy.  No noted family history of breast cancer.  Chronic insomnia: She is maintained on Ambien.  She has been off and on taking this for a while.  He feels as though this is helpful.  Acute concerns: She has also had a couple spots between the toes on her right foot for several weeks that has persisted.  Believes this is a fungal infection.  We discussed taking over-the-counter clotrimazole 1% cream with expected resolution in several weeks.  Routine health maintenance: She is due for cologuard and DEXA scan in December of this year.  Both of these were ordered.  She would like to obtain the COVID vaccine in office today with plans to pursue the flu vaccine in the coming weeks.    Objective   Vital Signs:  /60 (BP Location: Right arm, Patient Position: Sitting, Cuff Size: Adult)   Pulse 70    "Resp 18   Ht 172.7 cm (67.99\")   Wt 66.4 kg (146 lb 4.8 oz)   SpO2 97%   BMI 22.25 kg/m²   Estimated body mass index is 22.25 kg/m² as calculated from the following:    Height as of this encounter: 172.7 cm (67.99\").    Weight as of this encounter: 66.4 kg (146 lb 4.8 oz).    BMI is within normal parameters. No other follow-up for BMI required.      Physical Exam  Vitals and nursing note reviewed.   Constitutional:       General: She is not in acute distress.     Appearance: Normal appearance.   Cardiovascular:      Rate and Rhythm: Normal rate.   Pulmonary:      Effort: Pulmonary effort is normal.   Skin:     General: Skin is warm and dry.   Neurological:      Mental Status: She is alert.   Psychiatric:         Mood and Affect: Mood normal.         Judgment: Judgment normal.        Result Review :  The following data was reviewed by: Niesha Pérez MD on 10/23/2024:  Common labs          4/30/2024    11:21   Common Labs   Glucose 75    BUN 9    Creatinine 0.60    Sodium 137    Potassium 4.6    Chloride 102    Calcium 9.6    Total Protein 6.8    Albumin 4.2    Total Bilirubin 0.3    Alkaline Phosphatase 73    AST (SGOT) 18    ALT (SGPT) 13    WBC 4.94    Hemoglobin 13.3    Hematocrit 41.2    Platelets 285    Total Cholesterol 187    Triglycerides 75    HDL Cholesterol 65    LDL Cholesterol  108                Assessment and Plan   Diagnoses and all orders for this visit:    1. Migraine with aura and without status migrainosus, not intractable (Primary)  -     Ambulatory Referral to Neurology    2. Chronic midline low back pain without sciatica    3. Colon cancer screening  -     Cologuard - Stool, Per Rectum; Future    4. Postmenopausal  -     DEXA Bone Density Axial; Future    5. Tinea pedis of right foot  Comments:  Gave recommendation for clotrimazole 1% cream over-the-counter with anticipated resolution over several weeks    6. Controlled substance agreement signed  Comments:  Controlled substance contract " regarding Ambien and gabapentin reviewed and signed today    7. Sleep disorder  -     zolpidem CR (Ambien CR) 6.25 MG CR tablet; Take 1 tablet by mouth At Night As Needed for Sleep for up to 30 days.  Dispense: 30 tablet; Refill: 0    Other orders  -     COVID-19 (Pfizer) 12yrs+ (COMIRNATY)      PDMP reviewed.  Controlled substance contract reviewed and signed today in office.       Follow Up   Return in about 6 months (around 4/23/2025).  Patient was given instructions and counseling regarding her condition or for health maintenance advice. Please see specific information pulled into the AVS if appropriate.

## 2024-10-25 ENCOUNTER — PATIENT ROUNDING (BHMG ONLY) (OUTPATIENT)
Dept: INTERNAL MEDICINE | Facility: CLINIC | Age: 69
End: 2024-10-25
Payer: COMMERCIAL

## 2024-12-02 ENCOUNTER — OFFICE VISIT (OUTPATIENT)
Dept: INTERNAL MEDICINE | Facility: CLINIC | Age: 69
End: 2024-12-02
Payer: COMMERCIAL

## 2024-12-02 VITALS
SYSTOLIC BLOOD PRESSURE: 110 MMHG | HEART RATE: 61 BPM | WEIGHT: 151.8 LBS | BODY MASS INDEX: 23.01 KG/M2 | HEIGHT: 68 IN | DIASTOLIC BLOOD PRESSURE: 72 MMHG | OXYGEN SATURATION: 100 %

## 2024-12-02 DIAGNOSIS — R00.2 PALPITATIONS: Primary | ICD-10-CM

## 2024-12-02 PROCEDURE — 99213 OFFICE O/P EST LOW 20 MIN: CPT | Performed by: STUDENT IN AN ORGANIZED HEALTH CARE EDUCATION/TRAINING PROGRAM

## 2024-12-02 NOTE — PROGRESS NOTES
"Answers submitted by the patient for this visit:  Primary Reason for Visit (Submitted on 11/30/2024)  What is the primary reason for your visit?: Problem Not Listed  Chief Complaint  Palpitations (X 3 weeks)    Subjective        Linda Cooper presents to [facility] PRIMARY CARE  History of Present Illness  This is a 69-year-old female with migraine disorder and insomnia who presents for palpitations, awareness of heartbeats.  Has noticed these for the past 3 weeks, weekly in the evening after smoking cigarette.  She will sometimes notice them after coffee as well.  She denies shortness of breath, fatigue, limitations in exercise.  She has been walking without symptoms.  She has undergone cardiac evaluation in the past including exercise stress test, but this was some years ago.  She has seen a cardiologist in the past and would like to be referred to one given a family history of heart disease.  She does not have palpitations at present.  Does not notice if she has a fast heart rate during these episodes and does not have a way to monitor her heart rate at home.    Objective   Vital Signs:  /72 (BP Location: Left arm, Patient Position: Sitting, Cuff Size: Adult)   Pulse 61   Ht 172.7 cm (67.99\")   Wt 68.9 kg (151 lb 12.8 oz)   SpO2 100%   BMI 23.09 kg/m²   Estimated body mass index is 23.09 kg/m² as calculated from the following:    Height as of this encounter: 172.7 cm (67.99\").    Weight as of this encounter: 68.9 kg (151 lb 12.8 oz).    BMI is within normal parameters. No other follow-up for BMI required.      Physical Exam  Vitals and nursing note reviewed.   Constitutional:       General: She is not in acute distress.     Appearance: Normal appearance.   Cardiovascular:      Rate and Rhythm: Normal rate and regular rhythm.      Heart sounds: No murmur heard.  Pulmonary:      Effort: Pulmonary effort is normal.      Breath sounds: Normal breath sounds.   Skin:     General: Skin is " warm and dry.   Neurological:      Mental Status: She is alert.   Psychiatric:         Mood and Affect: Mood normal.         Judgment: Judgment normal.         Result Review :      Data reviewed : Cardiology studies EKG         Discussed with patient the possibility that nicotine as well as caffeine may be having a role as these are reasons to develop palpitations. Advised her to reach out with worsening symptoms such as fever, shortness of breath, change in exercise tolerance.    Assessment and Plan   Diagnoses and all orders for this visit:    1. Palpitations (Primary)  -     Ambulatory Referral to Cardiology             Follow Up   Return in about 4 months (around 4/2/2025) for Next scheduled follow up.  Patient was given instructions and counseling regarding her condition or for health maintenance advice. Please see specific information pulled into the AVS if appropriate.

## 2024-12-02 NOTE — Clinical Note
Good afternoon, I have recently assumed care of this patient as her new PCP. Wanted to reach out as this patient requested a cardiology referral regarding palpitations she has been having.  These have occurred in the context of nicotine and caffeine and I let her know these can be triggers.  But, she has a family history of heart disease (dad MI in 40s), and wanted further evaluation. Her ASCVD risk based on recent lipid panel does technically fall in the intermediate range range from happy to let her know about pursuing a coronary calcium CT. If there is anything else you would like me to order in the meantime before seeing you and happy to do so. Thanks! Niesha

## 2024-12-03 NOTE — PROGRESS NOTES
12/3/24 Spoke with patient. She is going to look up which doctor she wants to see and will call back.

## 2024-12-04 ENCOUNTER — TELEPHONE (OUTPATIENT)
Dept: INTERNAL MEDICINE | Facility: CLINIC | Age: 69
End: 2024-12-04
Payer: COMMERCIAL

## 2024-12-04 ENCOUNTER — TELEPHONE (OUTPATIENT)
Dept: CARDIOLOGY | Facility: CLINIC | Age: 69
End: 2024-12-04
Payer: COMMERCIAL

## 2024-12-04 NOTE — TELEPHONE ENCOUNTER
12/4/24 2:48 PM EST -----    Called and left a message for the patient to call the office back and schedule appointment per the below request.    Jozef - please follow up with this patient and schedule her with RM or TC in the MAIN office.    Thanks,  Veto    ----- Message from Radha Mesa sent at 12/3/2024  7:50 AM EST -----    New patient appointment-can see me or [employer]     ----- Message -----  From: Niesha Pérez MD  Sent: 12/2/2024   5:11 PM EST  To: Radha Mesa MD    Good afternoon,  I have recently assumed care of this patient as her new PCP.  Wanted to reach out as this patient requested a cardiology referral regarding palpitations she has been having.  These have occurred in the context of nicotine and caffeine and I let her know these can be triggers.  But, she has a family history of heart disease (dad MI in 40s), and wanted further evaluation.  Her ASCVD risk based on recent lipid panel does technically fall in the intermediate range range from happy to let her know about pursuing a coronary calcium CT.  If there is anything else you would like me to order in the meantime before seeing you and happy to do so.  Thanks!  Niesha

## 2024-12-04 NOTE — TELEPHONE ENCOUNTER
----- Message from Niesha Pérez sent at 12/4/2024 11:58 AM EST -----  [facility]  Can plan to repeat this in 3 years.  Dr. Pérez

## 2025-01-10 ENCOUNTER — PATIENT MESSAGE (OUTPATIENT)
Dept: INTERNAL MEDICINE | Facility: CLINIC | Age: 70
End: 2025-01-10
Payer: COMMERCIAL

## 2025-01-10 ENCOUNTER — OFFICE VISIT (OUTPATIENT)
Dept: CARDIOLOGY | Facility: CLINIC | Age: 70
End: 2025-01-10
Payer: COMMERCIAL

## 2025-01-10 VITALS
BODY MASS INDEX: 23.07 KG/M2 | SYSTOLIC BLOOD PRESSURE: 100 MMHG | HEART RATE: 63 BPM | WEIGHT: 152.2 LBS | OXYGEN SATURATION: 100 % | DIASTOLIC BLOOD PRESSURE: 66 MMHG | HEIGHT: 68 IN

## 2025-01-10 DIAGNOSIS — G43.109 MIGRAINE WITH AURA AND WITHOUT STATUS MIGRAINOSUS, NOT INTRACTABLE: Primary | ICD-10-CM

## 2025-01-10 DIAGNOSIS — R00.2 PALPITATIONS: ICD-10-CM

## 2025-01-10 DIAGNOSIS — Z82.49 FAMILY HISTORY OF CORONARY ARTERIOSCLEROSIS: ICD-10-CM

## 2025-01-10 PROCEDURE — 93000 ELECTROCARDIOGRAM COMPLETE: CPT | Performed by: STUDENT IN AN ORGANIZED HEALTH CARE EDUCATION/TRAINING PROGRAM

## 2025-01-10 PROCEDURE — 99204 OFFICE O/P NEW MOD 45 MIN: CPT | Performed by: STUDENT IN AN ORGANIZED HEALTH CARE EDUCATION/TRAINING PROGRAM

## 2025-01-10 NOTE — PATIENT INSTRUCTIONS
I think the symptoms you are describing are likely related to a condition called PVCs.  These are normal, but just underlying premature heartbeats that can sometimes occur from the bottom part of the heart.  Will confirm this diagnosis with a heart monitor, as well as an echo test to rule out any heart muscle problems.  Otherwise, your testing and exam today appears normal.    Given your family history of heart problems, I think we should check you for separate problem, which is increased plaque in the heart arteries.  We can do this at [facility] with vascular screening exams.  This might help us tailor your cholesterol therapy determine if we need to treat this aggressively to prevent you from having heart attacks and strokes.

## 2025-01-13 DIAGNOSIS — M54.31 SCIATICA OF RIGHT SIDE: ICD-10-CM

## 2025-01-13 DIAGNOSIS — M51.369 DDD (DEGENERATIVE DISC DISEASE), LUMBAR: ICD-10-CM

## 2025-01-13 RX ORDER — GABAPENTIN 300 MG/1
300 CAPSULE ORAL 2 TIMES DAILY
Qty: 180 CAPSULE | Refills: 0 | Status: SHIPPED | OUTPATIENT
Start: 2025-01-13

## 2025-01-13 NOTE — TELEPHONE ENCOUNTER
Rx Refill Note  Requested Prescriptions     Pending Prescriptions Disp Refills    gabapentin (NEURONTIN) 300 MG capsule 180 capsule 1     Sig: Take 1 capsule by mouth 2 (Two) Times a Day.      Last office visit with prescribing clinician: 12/2/2024   Last telemedicine visit with prescribing clinician: Visit date not found   Next office visit with prescribing clinician: 4/25/2025                         Would you like a call back once the refill request has been completed: [] Yes [] No    If the office needs to give you a call back, can they leave a voicemail: [] Yes [] No    Jordan Ocasio MA  01/13/25, 17:17 EST

## 2025-01-13 NOTE — TELEPHONE ENCOUNTER
Rx Refill Note  Requested Prescriptions     Pending Prescriptions Disp Refills    gabapentin (NEURONTIN) 300 MG capsule 180 capsule 1     Sig: Take 1 capsule by mouth 2 (Two) Times a Day.      Last office visit with prescribing clinician: 12/2/2024   Last telemedicine visit with prescribing clinician: Visit date not found   Next office visit with prescribing clinician: 4/25/2025                         Would you like a call back once the refill request has been completed: [] Yes [] No    If the office needs to give you a call back, can they leave a voicemail: [] Yes [] No    Jordan Ocasio MA  01/13/25, 17:20 EST

## 2025-02-07 ENCOUNTER — TELEPHONE (OUTPATIENT)
Dept: NEUROSURGERY | Facility: CLINIC | Age: 70
End: 2025-02-07
Payer: COMMERCIAL

## 2025-02-07 NOTE — TELEPHONE ENCOUNTER
PATIENT WOULD LIKE TO SEE A NURSE PRACTITIONER IN MARCH.  SHE IS CURRENTLY SCHEDULED IN MAY.  SHE IS HAVING SCIATIC ISSUES

## 2025-02-10 ENCOUNTER — HOSPITAL ENCOUNTER (OUTPATIENT)
Dept: CARDIOLOGY | Facility: HOSPITAL | Age: 70
Discharge: HOME | End: 2025-02-10
Admitting: STUDENT IN AN ORGANIZED HEALTH CARE EDUCATION/TRAINING PROGRAM
Payer: COMMERCIAL

## 2025-02-10 ENCOUNTER — OFFICE VISIT (OUTPATIENT)
Dept: NEUROLOGY | Facility: CLINIC | Age: 70
End: 2025-02-10
Payer: COMMERCIAL

## 2025-02-10 VITALS
OXYGEN SATURATION: 100 % | SYSTOLIC BLOOD PRESSURE: 122 MMHG | WEIGHT: 147 LBS | HEIGHT: 68 IN | HEART RATE: 58 BPM | DIASTOLIC BLOOD PRESSURE: 78 MMHG | BODY MASS INDEX: 22.28 KG/M2

## 2025-02-10 VITALS
SYSTOLIC BLOOD PRESSURE: 118 MMHG | HEIGHT: 68 IN | HEART RATE: 60 BPM | WEIGHT: 152 LBS | DIASTOLIC BLOOD PRESSURE: 70 MMHG | BODY MASS INDEX: 23.04 KG/M2

## 2025-02-10 DIAGNOSIS — R00.2 PALPITATIONS: ICD-10-CM

## 2025-02-10 DIAGNOSIS — G43.109 MIGRAINE WITH AURA AND WITHOUT STATUS MIGRAINOSUS, NOT INTRACTABLE: ICD-10-CM

## 2025-02-10 DIAGNOSIS — Z82.49 FAMILY HISTORY OF CORONARY ARTERIOSCLEROSIS: ICD-10-CM

## 2025-02-10 DIAGNOSIS — R68.89 FORGETFULNESS: ICD-10-CM

## 2025-02-10 DIAGNOSIS — G43.109 MIGRAINE WITH AURA AND WITHOUT STATUS MIGRAINOSUS, NOT INTRACTABLE: Primary | ICD-10-CM

## 2025-02-10 LAB
AORTIC ARCH: 2.2 CM
AORTIC DIMENSIONLESS INDEX: 0.69 (DI)
ASCENDING AORTA: 2.7 CM
AV MEAN PRESS GRAD SYS DOP V1V2: 6.1 MMHG
AV VMAX SYS DOP: 157.5 CM/SEC
BH CV ECHO MEAS - ACS: 1.54 CM
BH CV ECHO MEAS - AO MAX PG: 9.9 MMHG
BH CV ECHO MEAS - AO ROOT DIAM: 2.7 CM
BH CV ECHO MEAS - AO V2 VTI: 38.6 CM
BH CV ECHO MEAS - AVA(I,D): 1.95 CM2
BH CV ECHO MEAS - EDV(CUBED): 83.7 ML
BH CV ECHO MEAS - EDV(MOD-SP2): 79 ML
BH CV ECHO MEAS - EDV(MOD-SP4): 61 ML
BH CV ECHO MEAS - EF(MOD-SP2): 69.6 %
BH CV ECHO MEAS - EF(MOD-SP4): 65.6 %
BH CV ECHO MEAS - ESV(CUBED): 16.9 ML
BH CV ECHO MEAS - ESV(MOD-SP2): 24 ML
BH CV ECHO MEAS - ESV(MOD-SP4): 21 ML
BH CV ECHO MEAS - FS: 41.3 %
BH CV ECHO MEAS - IVS/LVPW: 0.79 CM
BH CV ECHO MEAS - IVSD: 0.8 CM
BH CV ECHO MEAS - LAT PEAK E' VEL: 12.1 CM/SEC
BH CV ECHO MEAS - LV DIASTOLIC VOL/BSA (35-75): 33.5 CM2
BH CV ECHO MEAS - LV MASS(C)D: 127.7 GRAMS
BH CV ECHO MEAS - LV MAX PG: 4.1 MMHG
BH CV ECHO MEAS - LV MEAN PG: 2.46 MMHG
BH CV ECHO MEAS - LV SYSTOLIC VOL/BSA (12-30): 11.5 CM2
BH CV ECHO MEAS - LV V1 MAX: 101.5 CM/SEC
BH CV ECHO MEAS - LV V1 VTI: 26.6 CM
BH CV ECHO MEAS - LVIDD: 4.4 CM
BH CV ECHO MEAS - LVIDS: 2.6 CM
BH CV ECHO MEAS - LVOT AREA: 2.8 CM2
BH CV ECHO MEAS - LVOT DIAM: 1.9 CM
BH CV ECHO MEAS - LVPWD: 1.01 CM
BH CV ECHO MEAS - MED PEAK E' VEL: 12.7 CM/SEC
BH CV ECHO MEAS - MV A DUR: 0.17 SEC
BH CV ECHO MEAS - MV A MAX VEL: 63 CM/SEC
BH CV ECHO MEAS - MV DEC SLOPE: 528.9 CM/SEC2
BH CV ECHO MEAS - MV DEC TIME: 0.18 SEC
BH CV ECHO MEAS - MV E MAX VEL: 86.1 CM/SEC
BH CV ECHO MEAS - MV E/A: 1.37
BH CV ECHO MEAS - MV MAX PG: 2.7 MMHG
BH CV ECHO MEAS - MV MEAN PG: 0.89 MMHG
BH CV ECHO MEAS - MV P1/2T: 48.1 MSEC
BH CV ECHO MEAS - MV V2 VTI: 22.4 CM
BH CV ECHO MEAS - MVA(P1/2T): 4.6 CM2
BH CV ECHO MEAS - MVA(VTI): 3.4 CM2
BH CV ECHO MEAS - PA ACC TIME: 0.15 SEC
BH CV ECHO MEAS - PA V2 MAX: 92.3 CM/SEC
BH CV ECHO MEAS - PULM A REVS DUR: 0.15 SEC
BH CV ECHO MEAS - PULM A REVS VEL: 33.8 CM/SEC
BH CV ECHO MEAS - PULM DIAS VEL: 26.1 CM/SEC
BH CV ECHO MEAS - PULM S/D: 1.67
BH CV ECHO MEAS - PULM SYS VEL: 43.7 CM/SEC
BH CV ECHO MEAS - QP/QS: 0.61
BH CV ECHO MEAS - RAP SYSTOLE: 3 MMHG
BH CV ECHO MEAS - RV MAX PG: 1.54 MMHG
BH CV ECHO MEAS - RV V1 MAX: 62.1 CM/SEC
BH CV ECHO MEAS - RV V1 VTI: 15.2 CM
BH CV ECHO MEAS - RVOT DIAM: 1.96 CM
BH CV ECHO MEAS - RVSP: 18.7 MMHG
BH CV ECHO MEAS - SUP REN AO DIAM: 1.7 CM
BH CV ECHO MEAS - SV(LVOT): 75.3 ML
BH CV ECHO MEAS - SV(MOD-SP2): 55 ML
BH CV ECHO MEAS - SV(MOD-SP4): 40 ML
BH CV ECHO MEAS - SV(RVOT): 45.8 ML
BH CV ECHO MEAS - SVI(LVOT): 41.4 ML/M2
BH CV ECHO MEAS - SVI(MOD-SP2): 30.2 ML/M2
BH CV ECHO MEAS - SVI(MOD-SP4): 22 ML/M2
BH CV ECHO MEAS - TAPSE (>1.6): 2.7 CM
BH CV ECHO MEAS - TR MAX PG: 15.7 MMHG
BH CV ECHO MEAS - TR MAX VEL: 198.1 CM/SEC
BH CV ECHO MEASUREMENTS AVERAGE E/E' RATIO: 6.94
BH CV ECHO SHUNT ASSESSMENT PERFORMED (HIDDEN SCRIPTING): 1
BH CV XLRA - RV BASE: 2.7 CM
BH CV XLRA - RV LENGTH: 6.3 CM
BH CV XLRA - RV MID: 2.3 CM
BH CV XLRA - TDI S': 14 CM/SEC
LEFT ATRIUM VOLUME INDEX: 21.9 ML/M2
LV EF BIPLANE MOD: 68.8 %
SINUS: 2.8 CM
STJ: 2.15 CM

## 2025-02-10 PROCEDURE — 93306 TTE W/DOPPLER COMPLETE: CPT

## 2025-02-10 PROCEDURE — 93306 TTE W/DOPPLER COMPLETE: CPT | Performed by: INTERNAL MEDICINE

## 2025-02-10 PROCEDURE — 99205 OFFICE O/P NEW HI 60 MIN: CPT | Performed by: PSYCHIATRY & NEUROLOGY

## 2025-02-10 NOTE — PROGRESS NOTES
Chief Complaint   Patient presents with    Migraine       Patient ID: Linda Cooper is a 69 y.o. female.    HPI:  I have had the pleasure of seeing your patient today.  As you may know she is a 69-year-old female being seen at the request of and referred to us by Dr. Pérez for history of migraine headache and memory change.  She says that she has had migraines since the early 80s.  She has an aura prior to the onset of head pain.  The head pain is not severe.  It tends to be a throbbing sensation in the temporal head region.  She will have some sensitivity to light and sound.  No nausea or vomiting.  She will have a visual aura prior to the onset of headache pain as a warning.  Headache can last up to several hours at a time.  No history of head trauma.  Her father had migraine headaches.  She has tried several over-the-counter medicines as well as prescribed medications.  Most recently she has been tried on Nurtec and Ubrelvy.  They were not very effective.  She has had some side effects to sumatriptan.  She received the most effectiveness with rizatriptan.  She has had 0 days of headaches within the last 30 days.  She has also noted some memory change over the past year or so.  She mentions being forgetful of dates and times of important events.  She forgets names easily.  She has put things in the freezer that were not meant to be in the freezer.  She is able to pay her own bills without any issue.  She drives without getting lost.  She is a .  No issues at work.  She does mention that both of her parents had some diminished cognition causing memory issues.  No hallucinations.  No resting tremor.  No specific changes of gait.    The following portions of the patient's history were reviewed and updated as appropriate: allergies, current medications, past family history, past medical history, past social history, past surgical history and problem list.    Review of Systems   Constitutional:   Positive for appetite change and fatigue.   Eyes:  Positive for photophobia (during migraines & phono) and visual disturbance (during migraines).   Gastrointestinal:  Positive for diarrhea (during migraines) and nausea (during migraines).   Neurological:  Positive for light-headedness and headaches. Negative for dizziness, tremors, seizures, syncope, facial asymmetry, speech difficulty, weakness and numbness.   Psychiatric/Behavioral:  Positive for decreased concentration and dysphoric mood. Negative for agitation, behavioral problems, confusion, hallucinations, self-injury, sleep disturbance and suicidal ideas. The patient is nervous/anxious. The patient is not hyperactive.       I have reviewed the review of systems above performed by my medical assistant.      Vitals:    02/10/25 1450   BP: 122/78   Pulse: 58   SpO2: 100%       Neurological Exam  Mental Status  Awake, alert and oriented to person, place and time. Recent and remote memory are intact. Speech is normal. Language is fluent with no aphasia. Attention and concentration are normal. Fund of knowledge is appropriate for level of education.    Cranial Nerves  CN I: Sense of smell is normal.  CN II: Visual acuity is normal.  CN III, IV, VI: Extraocular movements intact bilaterally. Pupils equal round and reactive to light bilaterally.  CN V: Facial sensation is normal.  CN VII: Full and symmetric facial movement.  CN XI: Shoulder shrug strength is normal.  CN XII: Tongue midline without atrophy or fasciculations.    Motor  Normal muscle bulk throughout. No fasciculations present. Normal muscle tone. No abnormal involuntary movements. No pronator drift.                                             Right                     Left  Rhomboids                            5                          5  Infraspinatus                          5                          5  Supraspinatus                       5                          5  Deltoid                                    5                          5   Biceps                                   5                          5  Brachioradialis                      5                          5   Triceps                                  5                          5   Pronator                                5                          5   Supinator                              5                           5   Wrist flexor                            5                          5   Wrist extensor                       5                          5   Finger flexor                          5                          5   Finger extensor                     5                          5   Interossei                              5                          5   Abductor pollicis brevis         5                          5   Flexor pollicis brevis             5                          5   Opponens pollicis                 5                          5  Extensor digitorum               5                          5  Abductor digiti minimi           5                          5   Abdominal                            5                          5  Glutei                                    5                          5  Hip abductor                         5                          5  Hip adductor                         5                          5   Iliopsoas                               5                          5   Quadriceps                           5                          5   Hamstring                             5                          5   Gastrocnemius                     5                           5   Anterior tibialis                      5                          5   Posterior tibialis                    5                          5   Peroneal                               5                          5  Ankle dorsiflexor                   5                          5  Ankle plantar flexor              5                            5  Extensor hallucis longus      5                           5    Sensory  Sensation is intact to light touch, pinprick, vibration and proprioception in all four extremities.    Reflexes  Deep tendon reflexes are 2+ and symmetric in all four extremities.    Right pathological reflexes: Andria's absent.  Left pathological reflexes: Andria's absent.    Coordination    Finger-to-nose, rapid alternating movements and heel-to-shin normal bilaterally without dysmetria.    Gait  Normal casual, toe, heel and tandem gait.       Physical Exam  Vitals reviewed.   Constitutional:       General: She is not in acute distress.     Appearance: She is well-developed.   HENT:      Head: Normocephalic and atraumatic.   Eyes:      Extraocular Movements: Extraocular movements intact.      Pupils: Pupils are equal, round, and reactive to light.   Cardiovascular:      Rate and Rhythm: Normal rate and regular rhythm.      Heart sounds: Normal heart sounds.   Pulmonary:      Effort: Pulmonary effort is normal. No respiratory distress.      Breath sounds: Normal breath sounds.   Abdominal:      General: Bowel sounds are normal. There is no distension.      Palpations: Abdomen is soft.      Tenderness: There is no abdominal tenderness.   Musculoskeletal:         General: No deformity.      Cervical back: Normal range of motion.   Skin:     General: Skin is warm.      Findings: No rash.   Neurological:      Coordination: Coordination is intact.      Deep Tendon Reflexes: Reflexes are normal and symmetric.   Psychiatric:         Speech: Speech normal.         Judgment: Judgment normal.         Procedures    Assessment/Plan: We will schedule MRI imaging of the brain both with and without contrast.  Neuropsych testing.  We will prescribe rizatriptan for her to use abortively for the migraine.  Return to clinic after neuropsych testing.  A total of 60 minutes was spent face-to-face with the patient today.  Of that greater than 50% of this time  was spent discussing signs and symptoms of cognitive changes, migraine headache, patient education, plan of care and prognosis.         Diagnoses and all orders for this visit:    1. Migraine with aura and without status migrainosus, not intractable (Primary)  -     MRI Brain With & Without Contrast; Future    2. Forgetfulness  -     Ambulatory Referral to Neuropsychology  -     MRI Brain With & Without Contrast; Future           Javier Becker II, MD

## 2025-02-11 ENCOUNTER — TELEPHONE (OUTPATIENT)
Dept: CARDIOLOGY | Facility: CLINIC | Age: 70
End: 2025-02-11
Payer: COMMERCIAL

## 2025-02-11 ENCOUNTER — PATIENT ROUNDING (BHMG ONLY) (OUTPATIENT)
Dept: NEUROLOGY | Facility: CLINIC | Age: 70
End: 2025-02-11
Payer: COMMERCIAL

## 2025-02-11 NOTE — TELEPHONE ENCOUNTER
----- Message from Zoina Meehan sent at 2/10/2025  4:25 PM EST -----  Let patient know her echocardiogram is stable.  Nothing on this to suggest her symptoms.  She does have a small PFO but in the setting of normal right-sided heart size and function no history of stroke this is a benign finding.  ----- Message -----  From: Maria Del Rosario Longoria MD  Sent: 2/10/2025   2:40 PM EST  To: Claudy Delgado MD

## 2025-02-11 NOTE — TELEPHONE ENCOUNTER
Notified Linda Cooper of results, patient verbalizes understanding.    Teresa Banks RN  [facility] Triage  02/11/25 08:25 EST

## 2025-02-11 NOTE — TELEPHONE ENCOUNTER
Called and left VM. Will continue to try to reach patient. HUB transfer call to triage.     Annabelle Argueta RN  [place] [facility]

## 2025-03-31 ENCOUNTER — HOSPITAL ENCOUNTER (OUTPATIENT)
Dept: CT IMAGING | Facility: HOSPITAL | Age: 70
Discharge: HOME | End: 2025-03-31

## 2025-03-31 ENCOUNTER — HOSPITAL ENCOUNTER (OUTPATIENT)
Dept: CARDIOLOGY | Facility: HOSPITAL | Age: 70
Discharge: HOME | End: 2025-03-31

## 2025-03-31 DIAGNOSIS — Z82.49 FAMILY HISTORY OF CORONARY ARTERIOSCLEROSIS: ICD-10-CM

## 2025-03-31 LAB
BH CV VAS SCREENING CAROTID CCA LEFT: 236 CM/SEC
BH CV VAS SCREENING CAROTID CCA RIGHT: 102 CM/SEC
BH CV VAS SCREENING CAROTID ICA LEFT: 73.7 CM/SEC
BH CV VAS SCREENING CAROTID ICA RIGHT: 96.3 CM/SEC
BH CV XLRA MEAS - PAD LEFT ABI PT: 1.2
BH CV XLRA MEAS - PAD LEFT ARM: 126 MMHG
BH CV XLRA MEAS - PAD LEFT LEG PT: 161 MMHG
BH CV XLRA MEAS - PAD RIGHT ABI PT: 1.14
BH CV XLRA MEAS - PAD RIGHT ARM: 135 MMHG
BH CV XLRA MEAS - PAD RIGHT LEG PT: 154 MMHG
BH CV XLRA MEAS - PROX AO DIAM: 2.1 CM
BH CV XLRA MEAS LEFT DIST CCA EDV: 35.7 CM/SEC
BH CV XLRA MEAS LEFT DIST CCA PSV: 184 CM/SEC
BH CV XLRA MEAS LEFT ICA/CCA RATIO: 0.4
BH CV XLRA MEAS LEFT PROX ICA EDV: -33.7 CM/SEC
BH CV XLRA MEAS LEFT PROX ICA PSV: -73.7 CM/SEC
BH CV XLRA MEAS RIGHT DIST CCA EDV: 28.6 CM/SEC
BH CV XLRA MEAS RIGHT DIST CCA PSV: 102 CM/SEC
BH CV XLRA MEAS RIGHT ICA/CCA RATIO: 0.9
BH CV XLRA MEAS RIGHT PROX ICA EDV: -41 CM/SEC
BH CV XLRA MEAS RIGHT PROX ICA PSV: -96.3 CM/SEC

## 2025-03-31 PROCEDURE — 93799 UNLISTED CV SVC/PROCEDURE: CPT

## 2025-03-31 PROCEDURE — VASCULARSCN2 VASCULAR SCREENING (BUNDLE) CAR: Performed by: INTERNAL MEDICINE

## 2025-03-31 PROCEDURE — 75571 CT HRT W/O DYE W/CA TEST: CPT

## 2025-04-08 DIAGNOSIS — M51.369 DDD (DEGENERATIVE DISC DISEASE), LUMBAR: ICD-10-CM

## 2025-04-08 DIAGNOSIS — M54.31 SCIATICA OF RIGHT SIDE: ICD-10-CM

## 2025-04-09 NOTE — TELEPHONE ENCOUNTER
Rx Refill Note  Requested Prescriptions     Pending Prescriptions Disp Refills    gabapentin (NEURONTIN) 300 MG capsule [Pharmacy Med Name: GABAPENTIN CAPS 300MG] 180 capsule 0     Sig: TAKE 1 CAPSULE TWICE A DAY      Last office visit with prescribing clinician: 12/2/2024   Last telemedicine visit with prescribing clinician: Visit date not found   Next office visit with prescribing clinician: 4/25/2025                         Would you like a call back once the refill request has been completed: [] Yes [] No    If the office needs to give you a call back, can they leave a voicemail: [] Yes [] No    Jordan Ocasio MA  04/09/25, 08:13 EDT

## 2025-04-10 RX ORDER — GABAPENTIN 300 MG/1
300 CAPSULE ORAL 2 TIMES DAILY
Qty: 180 CAPSULE | Refills: 0 | Status: SHIPPED | OUTPATIENT
Start: 2025-04-10

## 2025-04-17 RX ORDER — ESTRADIOL 0.1 MG/D
1 PATCH TRANSDERMAL
Qty: 4 PATCH | Refills: 0 | Status: SHIPPED | OUTPATIENT
Start: 2025-04-17

## 2025-04-17 RX ORDER — ESTRADIOL 0.1 MG/D
1 PATCH TRANSDERMAL
Qty: 12 PATCH | Refills: 5 | Status: CANCELLED | OUTPATIENT
Start: 2025-04-17

## 2025-04-17 NOTE — TELEPHONE ENCOUNTER
Rx Refill Note  Requested Prescriptions     Pending Prescriptions Disp Refills    estradiol (CLIMARA) 0.1 MG/24HR patch 12 patch 5     Sig: Place 1 patch on the skin as directed by provider Every 7 (Seven) Days.      Last office visit with prescribing clinician: 12/2/2024   Last telemedicine visit with prescribing clinician: Visit date not found   Next office visit with prescribing clinician: 4/25/2025                         Would you like a call back once the refill request has been completed: [] Yes [] No    If the office needs to give you a call back, can they leave a voicemail: [] Yes [] No    Jordan Ocasio MA  04/17/25, 13:09 EDT

## 2025-04-17 NOTE — TELEPHONE ENCOUNTER
PCP is out of office and I am on-call provider. As covering provider I am refilling medication for patient's PCP.  Further refills need to be directed to patient's primary care provider.  1 refill granted at this time until seen by PCP  Thank you,  ORVILLE Miller      Rx Refill Note  Requested Prescriptions     Pending Prescriptions Disp Refills    estradiol (CLIMARA) 0.1 MG/24HR patch 12 patch 5     Sig: Place 1 patch on the skin as directed by provider Every 7 (Seven) Days.      Last office visit with prescribing clinician: 12/2/2024   Last telemedicine visit with prescribing clinician: Visit date not found   Next office visit with prescribing clinician: 4/25/2025                         Would you like a call back once the refill request has been completed: [] Yes [] No    If the office needs to give you a call back, can they leave a voicemail: [] Yes [] No    Jordan Ocasio MA  04/17/25, 13:09 EDT

## 2025-04-17 NOTE — TELEPHONE ENCOUNTER
Rx Refill Note  Requested Prescriptions     Pending Prescriptions Disp Refills    estradiol (CLIMARA) 0.1 MG/24HR patch 12 patch 5     Sig: Place 1 patch on the skin as directed by provider Every 7 (Seven) Days.      Last office visit with prescribing clinician: 12/2/2024   Last telemedicine visit with prescribing clinician: Visit date not found   Next office visit with prescribing clinician: 4/25/2025                         Would you like a call back once the refill request has been completed: [] Yes [] No    If the office needs to give you a call back, can they leave a voicemail: [] Yes [] No    Jordan Ocasio MA  04/17/25, 08:10 EDT

## 2025-04-22 ENCOUNTER — TELEPHONE (OUTPATIENT)
Dept: INTERNAL MEDICINE | Facility: CLINIC | Age: 70
End: 2025-04-22
Payer: COMMERCIAL

## 2025-04-22 NOTE — TELEPHONE ENCOUNTER
Left voicemail for patient to give us a call back and let us know if she plans to get her dexa screen completed    Hub okay to relay and give number to scheduling [phone]

## 2025-04-23 NOTE — PROGRESS NOTES
"Chief Complaint  Migraine (Follow up on test/Discuss meds/sinuses)    Subjective        Linda Cooper presents to [facility]  History of Present Illness  69-year-old female with chronic medical conditions of migraine with aura, chronic low back pain, osteopenia who presents for follow-up.    Coronary calcium score with Dr. Delgado that was 0.  Other vascular screenings were normal.  Migraine with aura: She is on Ubrelvy as well as Nurtec.  She reports she has both migraine and tension headaches.  She is prescribed 75 mg Nurtec as well as 100 mg Ubrelvy without much benefit.   Chronic low back pain: She is on 300 mg gabapentin twice a day.  She reports this is overall helpful.  She follows with Dr. Camarillo as she has had multiple back surgeries in the past.  Postmenopausal state: She is on an estradiol patch which is beneficial.  She has had a hysterectomy.   Chronic insomnia: She is maintained on Ambien.  She has been off and on taking this for a while.  She feels as though this is helpful.    Objective   Vital Signs:  BP 90/60 (BP Location: Right arm, Patient Position: Sitting, Cuff Size: Small Adult)   Pulse 62   Resp 18   Ht 172.7 cm (67.99\")   Wt 67.9 kg (149 lb 9.6 oz)   SpO2 98%   BMI 22.75 kg/m²   Estimated body mass index is 22.75 kg/m² as calculated from the following:    Height as of this encounter: 172.7 cm (67.99\").    Weight as of this encounter: 67.9 kg (149 lb 9.6 oz).    BMI is within normal parameters. No other follow-up for BMI required.      Physical Exam  Vitals reviewed.   Constitutional:       General: She is not in acute distress.     Appearance: Normal appearance.   Neurological:      Mental Status: She is alert.   Psychiatric:         Mood and Affect: Mood normal.         Thought Content: Thought content normal.         Judgment: Judgment normal.        Result Review :  The following data was reviewed by: Niesha Pérez MD on 04/25/2025:    Data " reviewed : Cardiology studies vascular screening bundle           Assessment and Plan   Diagnoses and all orders for this visit:    1. Primary insomnia (Primary)  -     zolpidem CR (AMBIEN CR) 6.25 MG CR tablet; Take 1 tablet by mouth At Night As Needed for Sleep for up to 30 days.  Dispense: 30 tablet; Refill: 0    2. Recurrent sinusitis    3. Encounter for screening mammogram for malignant neoplasm of breast  -     Mammo Screening Digital Tomosynthesis Bilateral With CAD; Future    Patient does need Ambien refilled today.  PDMP reviewed.  Her mammogram is due in May and this was ordered as well.         Follow Up   Return in about 6 months (around 10/25/2025) for Annual physical.  Patient was given instructions and counseling regarding her condition or for health maintenance advice. Please see specific information pulled into the AVS if appropriate.

## 2025-04-25 ENCOUNTER — OFFICE VISIT (OUTPATIENT)
Dept: INTERNAL MEDICINE | Facility: CLINIC | Age: 70
End: 2025-04-25
Payer: COMMERCIAL

## 2025-04-25 VITALS
RESPIRATION RATE: 18 BRPM | OXYGEN SATURATION: 98 % | DIASTOLIC BLOOD PRESSURE: 60 MMHG | HEIGHT: 68 IN | HEART RATE: 62 BPM | SYSTOLIC BLOOD PRESSURE: 90 MMHG | WEIGHT: 149.6 LBS | BODY MASS INDEX: 22.67 KG/M2

## 2025-04-25 DIAGNOSIS — J32.9 RECURRENT SINUSITIS: ICD-10-CM

## 2025-04-25 DIAGNOSIS — F51.01 PRIMARY INSOMNIA: Primary | ICD-10-CM

## 2025-04-25 DIAGNOSIS — Z12.31 ENCOUNTER FOR SCREENING MAMMOGRAM FOR MALIGNANT NEOPLASM OF BREAST: ICD-10-CM

## 2025-04-25 RX ORDER — COVID-19 ANTIGEN TEST
KIT MISCELLANEOUS
COMMUNITY
Start: 2025-04-08

## 2025-04-25 RX ORDER — ZOLPIDEM TARTRATE 6.25 MG/1
6.25 TABLET, FILM COATED, EXTENDED RELEASE ORAL NIGHTLY PRN
Qty: 30 TABLET | Refills: 0 | Status: SHIPPED | OUTPATIENT
Start: 2025-04-25 | End: 2025-05-25

## 2025-04-29 NOTE — PROGRESS NOTES
Subjective   Patient ID: Linda Cooper is a 69 y.o. female is here today for follow-up.    History of Present Illness    It has been a year since have seen her in about 6 years since she had her last surgery.  There were 2 previous ones before that.  She has recurrent stenosis.  She had a flareup of left anterior thigh pain a few months ago but around its course after about 1 month.  She is back to her baseline.  She does her swimming and Pilates and exercises.  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  I will see her in 1 year with x-rays, sooner if there is a problem.    The following portions of the patient's history were reviewed and updated as appropriate: allergies, current medications, past family history, past medical history, past social history, past surgical history, and problem list.    Review of Systems   All other systems reviewed and are negative.      Objective   Physical Exam  Constitutional:       General: She is awake.      Appearance: She is well-developed.   HENT:      Head: Normocephalic and atraumatic.   Eyes:      General: Lids are normal.      Extraocular Movements: Extraocular movements intact.      Conjunctiva/sclera: Conjunctivae normal.      Pupils: Pupils are equal, round, and reactive to light.   Neck:      Vascular: No carotid bruit.   Neurological:      Mental Status: She is alert.      Coordination: Coordination is intact.      Deep Tendon Reflexes:      Reflex Scores:       Tricep reflexes are 2+ on the right side and 2+ on the left side.       Bicep reflexes are 2+ on the right side and 2+ on the left side.       Brachioradialis reflexes are 2+ on the right side and 2+ on the left side.       Patellar reflexes are 2+ on the right side and 2+ on the left side.       Achilles reflexes are 2+ on the right side and 2+ on the left side.  Psychiatric:         Speech: Speech normal.       Neurological Exam  Mental Status  Awake and  alert. Oriented only to person, place, time and situation. Recent and remote memory are intact. Speech is normal. Language is fluent with no aphasia. Attention and concentration are normal. Fund of knowledge is appropriate for level of education.    Cranial Nerves  CN II: Visual acuity is normal. Visual fields full to confrontation.  CN III, IV, VI: Extraocular movements intact bilaterally. Normal lids and orbits bilaterally. Pupils equal round and reactive to light bilaterally.  CN V: Facial sensation is normal.  CN VII: Full and symmetric facial movement.  CN IX, X: Palate elevates symmetrically. Normal gag reflex.  CN XI: Shoulder shrug strength is normal.  CN XII: Tongue midline without atrophy or fasciculations.    Motor  Normal muscle bulk throughout. Normal muscle tone.                                               Right                     Left  Rhomboids                            5                          5  Infraspinatus                          5                          5  Supraspinatus                       5                          5  Deltoid                                   5                          5   Biceps                                   5                          5  Brachioradialis                      5                          5   Triceps                                  5                          5   Pronator                                5                          5   Supinator                              5                           5   Wrist flexor                            5                          5   Wrist extensor                       5                          5   Finger flexor                          5                          5   Finger extensor                     5                          5   Interossei                              5                          5   Abductor pollicis brevis         5                          5   Flexor pollicis brevis             5                           5   Opponens pollicis                 5                          5  Extensor digitorum               5                          5  Abductor digiti minimi           5                          5   Abdominal                            5                          5  Glutei                                    5                          5  Hip abductor                         5                          5  Hip adductor                         5                          5   Iliopsoas                               5                          5   Quadriceps                           5                          5   Hamstring                             5                          5   Gastrocnemius                     5                           5   Anterior tibialis                      5                          5   Posterior tibialis                    5                          5   Peroneal                               5                          5  Ankle dorsiflexor                   5                          5  Ankle plantar flexor              5                           5  Extensor hallucis longus      5                           5    Sensory  Light touch is normal in upper and lower extremities. Proprioception is normal in upper and lower extremities.     Reflexes                                            Right                      Left  Brachioradialis                    2+                         2+  Biceps                                 2+                         2+  Triceps                                2+                         2+  Finger flex                           2+                         2+  Hamstring                            2+                         2+  Patellar                                2+                         2+  Achilles                                2+                         2+    Coordination    Finger-to-nose, rapid alternating movements and heel-to-shin normal  bilaterally without dysmetria.    Gait  Casual gait is normal including stance, stride, and arm swing.Normal toe walking. Normal heel walking. Normal tandem gait.       Assessment & Plan   Independent Review of Radiographic Studies:      I reviewed the lumbar MRI done on 8/9/2022 in [place] which does show recurrent spinal stenosis with severe right foraminal stenosis at L2-L3 and L3-L4.  There are postoperative changes from L2-S1.  There is some recurrent stenosis bilaterally at L4 and L5 and L5 and S1 with a spondylolisthesis at multiple levels and a rather pronounced scoliosis in the lumbar spine.  Agree with the report.      Medical Decision Making:      Doing well despite the flareup of sciatica into the left thigh.  I will see her in 1 year with x-rays.  She continue her exercise regimen.  If there is a flareup, she will let us know.      Diagnoses and all orders for this visit:    1. Dextroscoliosis of lumbar spine (Primary)  -     XR Spine Lumbar Complete With Flex & Ext; Future    2. Spinal stenosis of lumbar region with neurogenic claudication  -     XR Spine Lumbar Complete With Flex & Ext; Future    3. Degeneration of intervertebral disc of lumbar region with discogenic back pain and lower extremity pain  -     XR Spine Lumbar Complete With Flex & Ext; Future    4. History of spinal surgery  -     XR Spine Lumbar Complete With Flex & Ext; Future      Return in about 1 year (around 5/5/2026) for Face-to-face.

## 2025-05-02 ENCOUNTER — OFFICE VISIT (OUTPATIENT)
Dept: CARDIOLOGY | Age: 70
End: 2025-05-02
Payer: COMMERCIAL

## 2025-05-02 VITALS
OXYGEN SATURATION: 98 % | BODY MASS INDEX: 22.7 KG/M2 | DIASTOLIC BLOOD PRESSURE: 78 MMHG | SYSTOLIC BLOOD PRESSURE: 118 MMHG | HEIGHT: 68 IN | WEIGHT: 149.8 LBS

## 2025-05-02 DIAGNOSIS — E78.2 MIXED HYPERLIPIDEMIA: ICD-10-CM

## 2025-05-02 DIAGNOSIS — R00.2 PALPITATIONS: Primary | ICD-10-CM

## 2025-05-02 PROCEDURE — 99214 OFFICE O/P EST MOD 30 MIN: CPT | Performed by: STUDENT IN AN ORGANIZED HEALTH CARE EDUCATION/TRAINING PROGRAM

## 2025-05-02 NOTE — PROGRESS NOTES
[facility]    Subjective:     Encounter Date:05/02/25      Patient ID: Linda Cooper is a 69 y.o. female.    Chief Complaint:   Chief Complaint   Patient presents with    Migraine with aura and without status migrainosus, not intr     Patient is in the office today for her 6 month follow up appointment.       History of Present Illness    Linda Cooper is a very pleasant 69 y.o. lady who presents for further evaluation of palpitation episodes.    She reports a family history of coronary heart disease.  Her father had a MI in his 40s.  He had a subsequent LV aneurysm that required a aneurysmectomy at [facility].  Her father did smoke.  She does not.  She did have some nonspecific chest pain in 2013 and underwent a stress echo that was normal.    She presents today with a new complaint of  Palpitations which she describes as a prominent heartbeat sensation that occurred at rest.  Ultimately, cardiac testing revealed no significant arrhythmia correlates MI been related to the phenylephrine in her sinus medication.  She presents today for follow-up.  She underwent vascular screenings at [facility] that were squeaky clean, a Holter monitor was completed in March 2025 which hadAsymptomatic atrial runs and no significant ectopy that correlated with symptoms.  An echocardiogram was completed February 2025 which revealed a incidental note of a PFO.     Previous Cardiac Testing: Per above    The following portions of the patient's history were reviewed and updated as appropriate: allergies, current medications, past family history, past medical history, past social history, past surgical history and problem list.    Past Medical History:   Diagnosis Date    Allergic     CTS (carpal tunnel syndrome) maybe, MD thinks so    March 23    Endometriosis     Followed by Dr. eCrvantes. S/p hysterectomy 12/07.    GERD (gastroesophageal reflux disease) tiny bit    2014    Heart murmur age 10     "History of endometriosis     History of medical problems sinus problems    Low back pain     Lumbosacral disc disease     Menopausal symptoms     Will refil estradiol.    Migraine     Will start Frova and she will also try Excedrin migraine.    Mitral regurgitation     Trivial mitral regurg. No antibiotics needed.    Osteopenia osteopenia    Sciatica     Sciatica and s/p disc surgery at L5-S1. Stable now with occasional bouts of sciatica.    Scoliosis little bit       Past Surgical History:   Procedure Laterality Date    BACK SURGERY      HYSTERECTOMY  12/2007    Endometriosis. Followed by Dr. Cervantes. S/p hysterectomy 12/07.    LUMBAR DISC SURGERY      Sciatica and s/p disc surgery at L5-S1. Stable now with occasional bouts of sciatica.    LUMBAR LAMINECTOMY DISCECTOMY DECOMPRESSION Bilateral 01/17/2019    Procedure: L2-4 OPEN BILATERAL LUMBAR DECOMPRESSION AND RT L2/3 DISCECTOMY POSTERIOR;  Surgeon: Edson Camarillo MD;  Location: [facility];  Service: Neurosurgery    SPINE SURGERY  1992, 2012, 2019         Procedures       Objective:     Vitals:    05/02/25 0939   BP: 118/78   BP Location: Left arm   Patient Position: Sitting   Cuff Size: Adult   SpO2: 98%   Weight: 67.9 kg (149 lb 12.8 oz)   Height: 172.7 cm (67.99\")         Constitutional:       Appearance: Healthy appearance. Not in distress.   Neck:      Vascular: JVD normal.   Pulmonary:      Effort: Pulmonary effort is normal.      Breath sounds: Normal breath sounds.   Cardiovascular:      PMI at left midclavicular line. Normal rate. Regular rhythm. Normal S2.       Murmurs: There is no murmur.   Pulses:     Intact distal pulses.   Edema:     Peripheral edema absent.   Skin:     General: Skin is warm and dry.   Neurological:      General: No focal deficit present.      Mental Status: Alert, oriented to person, place, and time and oriented to person, place and time.   Psychiatric:         Mood and Affect: Mood and affect normal.         Lab Review: "         BUN   Date Value Ref Range Status   04/30/2024 9 8 - 23 mg/dL Final   01/10/2019 14 8 - 23 mg/dL Final     Creatinine   Date Value Ref Range Status   04/30/2024 0.60 0.57 - 1.00 mg/dL Final   01/10/2019 0.78 0.57 - 1.00 mg/dL Final     Potassium   Date Value Ref Range Status   04/30/2024 4.6 3.5 - 5.2 mmol/L Final   01/10/2019 4.7 3.5 - 5.2 mmol/L Final     ALT (SGPT)   Date Value Ref Range Status   04/30/2024 13 1 - 33 U/L Final     AST (SGOT)   Date Value Ref Range Status   04/30/2024 18 1 - 32 U/L Final         Performed        Assessment:          Diagnosis Plan   1. Palpitations        2. Mixed hyperlipidemia                 Plan:         Palpitations: No arrhythmia correlates on echo.  Incidental note of atrial runs but no accompanying symptoms.  No further treatment is needed for this at this time.  Possibly might have been exacerbated by stimulant properties of phenylephrine.  Echo structurally normal.  Family history of coronary heart disease vascular screens at [facility] [place], operated by [facility] were normal..   Elevated ASCVD risk.  Around 8 to 10%.  Predominantly due to smoking.  Normally, she would require moderate intensity statin therapy but without the evidence of coronary calcium, this is not required.  We discussed lifestyle modifications including tobacco cessation.  She does have a tiny amount of plaque in her aortic arch.  Defer further ASCVD risk management to her PCP.  In 5 years, if she continues to smoke and ASCVD continues to be elevated, repeat calcium score could be considered in 2030.   Orthostasis symptoms: Intermittent positional lightheadedness.  Encouraged to increase salt intake.  Echo structurally normal.  PFO.  Noted on echo.  Completely incidental.  No further testing required       RTC as needed.    Claudy Delgado MD  [facility]  05/02/25  10:21 EST       Current Outpatient Medications:     estradiol (CLIMARA) 0.1 MG/24HR patch, Place 1 patch on the skin as directed by  provider Every 7 (Seven) Days for 360 days., Disp: 12 patch, Rfl: 3    Flowflex COVID-19 Ag Home Test kit, , Disp: , Rfl:     gabapentin (NEURONTIN) 300 MG capsule, TAKE 1 CAPSULE TWICE A DAY, Disp: 180 capsule, Rfl: 0    loratadine (CLARITIN) 10 MG tablet, Take 1 tablet by mouth Daily As Needed for Allergies., Disp: 90 tablet, Rfl: 0    Phenylephrine-Ibuprofen (ADVIL SINUS CONGESTION & PAIN PO), Take 1 tablet by mouth Daily., Disp: , Rfl:     Rimegepant Sulfate (NURTEC) 75 MG tablet dispersible tablet, Take 1 tablet by mouth Daily As Needed (Migraine)., Disp: 30 tablet, Rfl: 3    ubrogepant (UBRELVY) 100 MG tablet, Take 1 tablet by mouth 1 (One) Time As Needed (Migraine) for up to 5 doses., Disp: 10 tablet, Rfl: 4    zolpidem CR (AMBIEN CR) 6.25 MG CR tablet, Take 1 tablet by mouth At Night As Needed for Sleep for up to 30 days., Disp: 30 tablet, Rfl: 0         Return if symptoms worsen or fail to improve.      Part of this note may be an electronic transcription/translation of spoken language to printed text using the Dragon Dictation System.

## 2025-05-05 ENCOUNTER — OFFICE VISIT (OUTPATIENT)
Dept: NEUROSURGERY | Facility: CLINIC | Age: 70
End: 2025-05-05
Payer: COMMERCIAL

## 2025-05-05 VITALS — SYSTOLIC BLOOD PRESSURE: 118 MMHG | HEART RATE: 59 BPM | DIASTOLIC BLOOD PRESSURE: 68 MMHG | OXYGEN SATURATION: 99 %

## 2025-05-05 DIAGNOSIS — Z98.890 HISTORY OF SPINAL SURGERY: ICD-10-CM

## 2025-05-05 DIAGNOSIS — M48.062 SPINAL STENOSIS OF LUMBAR REGION WITH NEUROGENIC CLAUDICATION: ICD-10-CM

## 2025-05-05 DIAGNOSIS — M51.362 DEGENERATION OF INTERVERTEBRAL DISC OF LUMBAR REGION WITH DISCOGENIC BACK PAIN AND LOWER EXTREMITY PAIN: ICD-10-CM

## 2025-05-05 DIAGNOSIS — M41.86 DEXTROSCOLIOSIS OF LUMBAR SPINE: Primary | ICD-10-CM

## 2025-05-05 PROCEDURE — 99212 OFFICE O/P EST SF 10 MIN: CPT | Performed by: NEUROLOGICAL SURGERY

## 2025-06-05 ENCOUNTER — PATIENT OUTREACH (OUTPATIENT)
Dept: INTERNAL MEDICINE | Facility: CLINIC | Age: 70
End: 2025-06-05
Payer: COMMERCIAL

## 2025-06-05 NOTE — OUTREACH NOTE
Pt has been notified 3 times regarding the over due Dexa, still not scheduled  Would like to cancel the order?

## 2025-07-03 DIAGNOSIS — M51.369 DDD (DEGENERATIVE DISC DISEASE), LUMBAR: ICD-10-CM

## 2025-07-03 DIAGNOSIS — M54.31 SCIATICA OF RIGHT SIDE: ICD-10-CM

## 2025-07-03 RX ORDER — GABAPENTIN 300 MG/1
300 CAPSULE ORAL 2 TIMES DAILY
Qty: 180 CAPSULE | Refills: 0 | Status: SHIPPED | OUTPATIENT
Start: 2025-07-03

## 2025-07-03 NOTE — TELEPHONE ENCOUNTER
Rx Refill Note  Requested Prescriptions     Pending Prescriptions Disp Refills    gabapentin (NEURONTIN) 300 MG capsule [Pharmacy Med Name: GABAPENTIN CAPS 300MG] 180 capsule 0     Sig: TAKE 1 CAPSULE TWICE A DAY      Last office visit with prescribing clinician: 4/25/2025   Last telemedicine visit with prescribing clinician: Visit date not found   Next office visit with prescribing clinician: 11/11/2025                         Would you like a call back once the refill request has been completed: [] Yes [] No    If the office needs to give you a call back, can they leave a voicemail: [] Yes [] No    Jordan Ocasio MA  07/03/25, 15:43 EDT

## (undated) DEVICE — ENCORE® LATEX ORTHO SIZE 8, STERILE LATEX POWDER-FREE SURGICAL GLOVE: Brand: ENCORE

## (undated) DEVICE — DISPOSABLE BIPOLAR FORCEPS 7 3/4" (19.7CM) SCOVILLE BAYONET, 1.5MM TIP AND 12 FT. (3.6M) CABLE: Brand: KIRWAN

## (undated) DEVICE — ANTIBACTERIAL UNDYED BRAIDED (POLYGLACTIN 910), SYNTHETIC ABSORBABLE SUTURE: Brand: COATED VICRYL

## (undated) DEVICE — DRSNG WND GZ PAD BORDERED 4X8IN STRL

## (undated) DEVICE — GLV SURG BIOGEL LTX PF 7

## (undated) DEVICE — MARKR SKIN W/RULR AND LBL

## (undated) DEVICE — 3.0MM PRECISION NEURO (MATCH HEAD)

## (undated) DEVICE — PK NEURO SPINE 40

## (undated) DEVICE — SYR CONTRL LUERLOK 10CC

## (undated) DEVICE — OIL CARTRIDGE: Brand: CORE, MAESTRO

## (undated) DEVICE — GOWN,NON-REINFORCED,SIRUS,SET IN SLV,XXL: Brand: MEDLINE

## (undated) DEVICE — ADHS SKIN DERMABOND TOP ADVANCED

## (undated) DEVICE — TOWEL,OR,DSP,ST,BLUE,STD,4/PK,20PK/CS: Brand: MEDLINE

## (undated) DEVICE — DRSNG WND BORDR/ADHS NONADHR/GZ LF 4X4IN STRL

## (undated) DEVICE — NDL SPINE 22G 31/2IN BLK

## (undated) DEVICE — DRN JP FLT NO TROC SIL FUL/PERF 7MM

## (undated) DEVICE — 3M™ STERI-STRIP™ REINFORCED ADHESIVE SKIN CLOSURES, R1547, 1/2 IN X 4 IN (12 MM X 100 MM), 6 STRIPS/ENVELOPE: Brand: 3M™ STERI-STRIP™

## (undated) DEVICE — APPL CHLORAPREP W/TINT 26ML ORNG

## (undated) DEVICE — 6.0MM PRECISION ROUND

## (undated) DEVICE — JACKSON-PRATT 100CC BULB RESERVOIR: Brand: CARDINAL HEALTH

## (undated) DEVICE — DIFFUSER: Brand: CORE, MAESTRO

## (undated) DEVICE — DRP MICROSCOPE 4 BINOCULAR CV 54X150IN

## (undated) DEVICE — 3M™ STERI-STRIP™ ANTIMICROBIAL SKIN CLOSURES 1/2 INCH X 4 INCHES 50/CARTON 4 CARTONS/CASE A1847: Brand: 3M™ STERI-STRIP™

## (undated) DEVICE — SUT SILK 2/0 FS BLK 18IN 685G